# Patient Record
Sex: FEMALE | Race: WHITE | Employment: OTHER | URBAN - METROPOLITAN AREA
[De-identification: names, ages, dates, MRNs, and addresses within clinical notes are randomized per-mention and may not be internally consistent; named-entity substitution may affect disease eponyms.]

---

## 2017-05-08 ENCOUNTER — HOSPITAL ENCOUNTER (OUTPATIENT)
Dept: PHYSICAL THERAPY | Age: 62
Discharge: HOME | End: 2017-05-31
Attending: ORTHOPAEDIC SURGERY | Admitting: ORTHOPAEDIC SURGERY

## 2017-05-08 ASSESSMENT — PAIN DESCRIPTION - PAIN TYPE: TYPE: SURGICAL PAIN

## 2017-05-08 ASSESSMENT — PAIN DESCRIPTION - FREQUENCY: FREQUENCY: CONTINUOUS

## 2017-05-08 ASSESSMENT — PAIN DESCRIPTION - ONSET: ONSET: GRADUAL

## 2017-05-08 ASSESSMENT — PAIN DESCRIPTION - LOCATION: LOCATION: KNEE

## 2017-05-08 ASSESSMENT — PAIN DESCRIPTION - PROGRESSION: CLINICAL_PROGRESSION: GRADUALLY IMPROVING

## 2017-05-08 ASSESSMENT — PAIN DESCRIPTION - ORIENTATION: ORIENTATION: RIGHT

## 2017-05-08 ASSESSMENT — PAIN DESCRIPTION - DESCRIPTORS: DESCRIPTORS: ACHING

## 2017-05-08 ASSESSMENT — PAIN SCALES - GENERAL: PAINLEVEL_OUTOF10: 8

## 2017-09-07 ENCOUNTER — OFFICE VISIT (OUTPATIENT)
Dept: ORTHOPEDIC SURGERY | Age: 62
End: 2017-09-07

## 2017-09-07 VITALS
RESPIRATION RATE: 16 BRPM | HEIGHT: 67 IN | TEMPERATURE: 99.8 F | HEART RATE: 84 BPM | WEIGHT: 185 LBS | SYSTOLIC BLOOD PRESSURE: 178 MMHG | BODY MASS INDEX: 29.03 KG/M2 | DIASTOLIC BLOOD PRESSURE: 100 MMHG

## 2017-09-07 DIAGNOSIS — M17.11 PRIMARY OSTEOARTHRITIS OF RIGHT KNEE: ICD-10-CM

## 2017-09-07 DIAGNOSIS — G89.29 CHRONIC PAIN OF RIGHT KNEE: Primary | ICD-10-CM

## 2017-09-07 DIAGNOSIS — M25.561 CHRONIC PAIN OF RIGHT KNEE: Primary | ICD-10-CM

## 2017-09-07 PROCEDURE — 99202 OFFICE O/P NEW SF 15 MIN: CPT | Performed by: ORTHOPAEDIC SURGERY

## 2017-09-07 PROCEDURE — 20610 DRAIN/INJ JOINT/BURSA W/O US: CPT | Performed by: ORTHOPAEDIC SURGERY

## 2018-03-29 ENCOUNTER — OFFICE VISIT (OUTPATIENT)
Dept: ORTHOPEDIC SURGERY | Age: 63
End: 2018-03-29

## 2018-03-29 VITALS
RESPIRATION RATE: 16 BRPM | HEIGHT: 67 IN | WEIGHT: 185 LBS | SYSTOLIC BLOOD PRESSURE: 149 MMHG | DIASTOLIC BLOOD PRESSURE: 89 MMHG | TEMPERATURE: 99.3 F | HEART RATE: 74 BPM | BODY MASS INDEX: 29.03 KG/M2

## 2018-03-29 DIAGNOSIS — M17.11 PRIMARY OSTEOARTHRITIS OF RIGHT KNEE: Primary | ICD-10-CM

## 2018-03-29 PROCEDURE — 3014F SCREEN MAMMO DOC REV: CPT | Performed by: ORTHOPAEDIC SURGERY

## 2018-03-29 PROCEDURE — G8427 DOCREV CUR MEDS BY ELIG CLIN: HCPCS | Performed by: ORTHOPAEDIC SURGERY

## 2018-03-29 PROCEDURE — 3017F COLORECTAL CA SCREEN DOC REV: CPT | Performed by: ORTHOPAEDIC SURGERY

## 2018-03-29 PROCEDURE — G8419 CALC BMI OUT NRM PARAM NOF/U: HCPCS | Performed by: ORTHOPAEDIC SURGERY

## 2018-03-29 PROCEDURE — G8484 FLU IMMUNIZE NO ADMIN: HCPCS | Performed by: ORTHOPAEDIC SURGERY

## 2018-03-29 PROCEDURE — 20610 DRAIN/INJ JOINT/BURSA W/O US: CPT | Performed by: ORTHOPAEDIC SURGERY

## 2018-03-29 PROCEDURE — 99214 OFFICE O/P EST MOD 30 MIN: CPT | Performed by: ORTHOPAEDIC SURGERY

## 2018-03-29 PROCEDURE — 1036F TOBACCO NON-USER: CPT | Performed by: ORTHOPAEDIC SURGERY

## 2018-03-29 NOTE — LETTER
participants health issues and personal information. As a matter of trust, it is your duty to keep everything you hear confidential.  Nothing that identifies a participant in any way (including job, ethnicity, Nondenominational, etc.) can be shared outside of this group setting. I have read and agree to the following statements:        · I agree to meet with a group of patients and my doctor. I understand that I have the choice to be seen by my physician in this group or individually and that I may leave the group visit anytime I feel uncomfortable. · I understand that discussions will occur regarding individual identifiable health information during the shared medical appointment and I will maintain the confidentiality of [facility] health information or other information heard during the appointment. · I am committed to maintaining this confidentiality even if I am no longer participating in shared medical appointments. · I understand that the information I share with the physician and staff will be heard by the other participants and there is a possibility that the information disclosed in the shared medical appointment may be re-disclosed by other participants. I have been notified of this potential disclosure and I voluntarily agree to participate in the shared medical appointment. · I know that I dont have to share any personal information with the group or health care providers unless, I choose to do so. · Like any doctors appointment, I agree to be responsible for the bill and / or co-payment associated with this physician appointment. Shared Medical Appointment              THIS SUPPLEMENTAL NOTICE SUPPLEMENTS AND DOES NOT REPLACE THE [facility] CONSENT, PATIENT RESPONSIBILITY AND NOTICE OF PRIVACY PRACTICE.         Marilin Murillo    1955        Patients Signature: ____________________________________________________         DATE: ____/____/___ Spouses / Support Persons Signature (if applicable) _________________________     DATE: __/__/__    **Each person will be asked to sign this commitment before each Shared Medical Appointment. Thank You ! SURGERY SCHEDULING TIMES                                                                                                                                                      Tonia Major                                                                                       1955                                                                           SURGERY DATE: ___/___/___                                                                      SURGERY TIME:  ___:___ AM/PM                                                                      ARRIVAL TIME:   ___:___  AM/PM                                                                                                                        **PLEASE REPORT TO THE                                                       INFORMATION DESK IN THE MAIN LOBBY                                                          OF THE HOSPITAL.         [facility]

## 2018-09-25 ENCOUNTER — TELEPHONE (OUTPATIENT)
Dept: ORTHOPEDIC SURGERY | Age: 63
End: 2018-09-25

## 2018-10-04 ENCOUNTER — OFFICE VISIT (OUTPATIENT)
Dept: ORTHOPEDIC SURGERY | Age: 63
End: 2018-10-04
Payer: COMMERCIAL

## 2018-10-04 VITALS
BODY MASS INDEX: 33.27 KG/M2 | SYSTOLIC BLOOD PRESSURE: 145 MMHG | DIASTOLIC BLOOD PRESSURE: 92 MMHG | HEART RATE: 98 BPM | WEIGHT: 212 LBS | TEMPERATURE: 97.2 F | HEIGHT: 67 IN

## 2018-10-04 DIAGNOSIS — M17.11 ARTHRITIS OF RIGHT KNEE: ICD-10-CM

## 2018-10-04 DIAGNOSIS — M25.561 RIGHT KNEE PAIN, UNSPECIFIED CHRONICITY: Primary | ICD-10-CM

## 2018-10-04 PROCEDURE — 3017F COLORECTAL CA SCREEN DOC REV: CPT | Performed by: PHYSICIAN ASSISTANT

## 2018-10-04 PROCEDURE — G8417 CALC BMI ABV UP PARAM F/U: HCPCS | Performed by: PHYSICIAN ASSISTANT

## 2018-10-04 PROCEDURE — G8427 DOCREV CUR MEDS BY ELIG CLIN: HCPCS | Performed by: PHYSICIAN ASSISTANT

## 2018-10-04 PROCEDURE — G8484 FLU IMMUNIZE NO ADMIN: HCPCS | Performed by: PHYSICIAN ASSISTANT

## 2018-10-04 PROCEDURE — 99212 OFFICE O/P EST SF 10 MIN: CPT | Performed by: PHYSICIAN ASSISTANT

## 2018-10-04 PROCEDURE — 20610 DRAIN/INJ JOINT/BURSA W/O US: CPT | Performed by: PHYSICIAN ASSISTANT

## 2018-10-04 PROCEDURE — 1036F TOBACCO NON-USER: CPT | Performed by: PHYSICIAN ASSISTANT

## 2018-10-09 ENCOUNTER — TELEPHONE (OUTPATIENT)
Dept: ORTHOPEDIC SURGERY | Age: 63
End: 2018-10-09

## 2018-10-09 PROBLEM — M17.11 ARTHRITIS OF RIGHT KNEE: Status: ACTIVE | Noted: 2018-10-09

## 2018-10-09 NOTE — PROGRESS NOTES
with intra-articular injection today. Cortisone Injection                                                       PROCEDURE NOTE:     Pre op Diagnosis:  right knee pain     Post op Diagnosis: Same  With the patient's permission, her right knee was prepped  in standard sterile fashion with  Alcohol and 2 cc of 0.25% Marcaine and 1 cc of Kenalog 40 mg was injected into the right lateral compartment  without difficulty. The patient tolerated this well without difficulty. A band-aid was applied. The patient was advised to ice the knee for 15-20 minutes to relieve any injection site related pain. She would like to try Visco supplementation injections at this time. Follow Up: After Visco supplementation injections have been authorized. Call or return to clinic prn if these symptoms worsen or fail to improve as anticipated.

## 2018-10-23 ENCOUNTER — OFFICE VISIT (OUTPATIENT)
Dept: ORTHOPEDIC SURGERY | Age: 63
End: 2018-10-23
Payer: COMMERCIAL

## 2018-10-23 ENCOUNTER — TELEPHONE (OUTPATIENT)
Dept: ORTHOPEDIC SURGERY | Age: 63
End: 2018-10-23

## 2018-10-23 VITALS
SYSTOLIC BLOOD PRESSURE: 148 MMHG | HEIGHT: 67 IN | DIASTOLIC BLOOD PRESSURE: 80 MMHG | HEART RATE: 68 BPM | WEIGHT: 212 LBS | BODY MASS INDEX: 33.27 KG/M2 | TEMPERATURE: 97.9 F

## 2018-10-23 DIAGNOSIS — M17.11 ARTHRITIS OF RIGHT KNEE: Primary | ICD-10-CM

## 2018-10-23 PROCEDURE — 20610 DRAIN/INJ JOINT/BURSA W/O US: CPT | Performed by: PHYSICIAN ASSISTANT

## 2018-10-23 NOTE — TELEPHONE ENCOUNTER
Spoke with patient. Please take from your supply for the right knee with Euflexxa.     Auth#: [employer]  Date:

## 2018-11-01 ENCOUNTER — OFFICE VISIT (OUTPATIENT)
Dept: ORTHOPEDIC SURGERY | Age: 63
End: 2018-11-01
Payer: COMMERCIAL

## 2018-11-01 VITALS — HEIGHT: 67 IN | TEMPERATURE: 98.1 F | WEIGHT: 212 LBS | BODY MASS INDEX: 33.27 KG/M2

## 2018-11-01 DIAGNOSIS — M17.11 ARTHRITIS OF RIGHT KNEE: Primary | ICD-10-CM

## 2018-11-01 PROCEDURE — 20610 DRAIN/INJ JOINT/BURSA W/O US: CPT | Performed by: PHYSICIAN ASSISTANT

## 2018-11-14 ENCOUNTER — OFFICE VISIT (OUTPATIENT)
Dept: ORTHOPEDIC SURGERY | Age: 63
End: 2018-11-14
Payer: COMMERCIAL

## 2018-11-14 VITALS — BODY MASS INDEX: 33.27 KG/M2 | HEIGHT: 67 IN | TEMPERATURE: 98.2 F | WEIGHT: 212 LBS

## 2018-11-14 DIAGNOSIS — M17.11 PRIMARY OSTEOARTHRITIS OF RIGHT KNEE: Primary | ICD-10-CM

## 2018-11-14 PROCEDURE — 20610 DRAIN/INJ JOINT/BURSA W/O US: CPT | Performed by: PHYSICIAN ASSISTANT

## 2018-11-14 NOTE — PROGRESS NOTES
Subjective:    She  is here for repeat Euflexxa injection #3 for the  right knee. Objective:   Temperature 98.2 °F (36.8 °C), temperature source Temporal, height 5' 7\" (1.702 m), weight 212 lb (96.2 kg), currently breastfeeding. There is a mild joint effusion noted of the right knee. There is mild pain with range of motion testing. There is no significant  instability noted. Assessment:    ICD-10-CM    1. Primary osteoarthritis of right knee M17.11 91662 - VT DRAIN/INJECT LARGE JOINT/BURSA     EUFLEXXA INJ PER DOSE       Plan:  Proceed with repeat injection in the series of 3 injections. PROCEDURE NOTE:  PRE-PROCEDURE DIAGNOSIS: DJD knee  POST-PROCEDURE DIAGNOSIS: DJD knee  With the patient's permission, her right knee was prepped in standard sterile fashion with  Alcohol. The prefilled injection of the preferred visco supplementation ( Euflexxa 20 mg/ 2 ML ) was injected into the  lateral joint space compartment without difficulty. The patient tolerated the procedure(s) well without difficulty. A band-aid was applied. POST-PROCEDURE INSTRUCTIONS GIVEN TO PATIENT:   She was advised to ice the knee for 15-20 minutes to relieve any injection site related pain. Decrease activity for the next 24 to 48 hours. May use prescription or OTC pain relievers as needed    FOLLOW-UP: 6 weeks  As directed or call or return to clinic if these symptoms worsen or fail to improve as anticipated. If at any time you are concerned you may contact the office for further instructions or care.

## 2018-11-28 ENCOUNTER — TELEPHONE (OUTPATIENT)
Dept: ORTHOPEDIC SURGERY | Age: 63
End: 2018-11-28

## 2019-01-14 ENCOUNTER — OFFICE VISIT (OUTPATIENT)
Dept: ORTHOPEDIC SURGERY | Age: 64
End: 2019-01-14
Payer: COMMERCIAL

## 2019-01-14 VITALS
HEIGHT: 67 IN | TEMPERATURE: 98.1 F | SYSTOLIC BLOOD PRESSURE: 167 MMHG | BODY MASS INDEX: 32.39 KG/M2 | HEART RATE: 82 BPM | WEIGHT: 206.4 LBS | DIASTOLIC BLOOD PRESSURE: 84 MMHG

## 2019-01-14 DIAGNOSIS — M17.11 ARTHRITIS OF RIGHT KNEE: Primary | ICD-10-CM

## 2019-01-14 PROCEDURE — G8484 FLU IMMUNIZE NO ADMIN: HCPCS | Performed by: ORTHOPAEDIC SURGERY

## 2019-01-14 PROCEDURE — G8417 CALC BMI ABV UP PARAM F/U: HCPCS | Performed by: ORTHOPAEDIC SURGERY

## 2019-01-14 PROCEDURE — 99214 OFFICE O/P EST MOD 30 MIN: CPT | Performed by: ORTHOPAEDIC SURGERY

## 2019-01-14 PROCEDURE — 3017F COLORECTAL CA SCREEN DOC REV: CPT | Performed by: ORTHOPAEDIC SURGERY

## 2019-01-14 PROCEDURE — G8428 CUR MEDS NOT DOCUMENT: HCPCS | Performed by: ORTHOPAEDIC SURGERY

## 2019-01-14 PROCEDURE — 1036F TOBACCO NON-USER: CPT | Performed by: ORTHOPAEDIC SURGERY

## 2019-02-14 ENCOUNTER — PREP FOR PROCEDURE (OUTPATIENT)
Dept: ORTHOPEDICS UNIT | Age: 64
End: 2019-02-14

## 2019-02-18 ENCOUNTER — PREP FOR PROCEDURE (OUTPATIENT)
Dept: ORTHOPEDIC SURGERY | Age: 64
End: 2019-02-18

## 2019-02-18 DIAGNOSIS — M17.11 PRIMARY OSTEOARTHRITIS OF RIGHT KNEE: Primary | ICD-10-CM

## 2019-02-18 RX ORDER — OXYCODONE HYDROCHLORIDE 5 MG/1
10 TABLET ORAL ONCE
Status: CANCELLED | OUTPATIENT
Start: 2019-02-18 | End: 2019-02-18

## 2019-02-18 RX ORDER — CELECOXIB 200 MG/1
200 CAPSULE ORAL ONCE
Status: CANCELLED | OUTPATIENT
Start: 2019-02-18 | End: 2019-02-18

## 2019-02-19 ENCOUNTER — HOSPITAL ENCOUNTER (OUTPATIENT)
Dept: PREADMISSION TESTING | Age: 64
Discharge: HOME | End: 2019-02-23
Payer: COMMERCIAL

## 2019-02-19 DIAGNOSIS — M17.11 PRIMARY OSTEOARTHRITIS OF RIGHT KNEE: ICD-10-CM

## 2019-02-19 LAB
ABO/RH: NORMAL
ALBUMIN SERPL-MCNC: 4.5 G/DL (ref 3.4–5)
ANION GAP SERPL CALCULATED.3IONS-SCNC: 11 MMOL/L (ref 3–16)
ANTIBODY SCREEN: NORMAL
APTT: 29.7 SEC (ref 26–36)
BASOPHILS ABSOLUTE: 0 K/UL (ref 0–0.2)
BASOPHILS RELATIVE PERCENT: 0.3 %
BILIRUBIN URINE: NEGATIVE
BLOOD, URINE: ABNORMAL
BUN BLDV-MCNC: 11 MG/DL (ref 7–20)
CALCIUM SERPL-MCNC: 9.5 MG/DL (ref 8.3–10.6)
CHLORIDE BLD-SCNC: 98 MMOL/L (ref 99–110)
CLARITY: ABNORMAL
CO2: 28 MMOL/L (ref 21–32)
COLOR: YELLOW
CREAT SERPL-MCNC: 0.6 MG/DL (ref 0.6–1.2)
EOSINOPHILS ABSOLUTE: 0.2 K/UL (ref 0–0.6)
EOSINOPHILS RELATIVE PERCENT: 2.3 %
EPITHELIAL CELLS, UA: 3 /HPF (ref 0–5)
ESTIMATED AVERAGE GLUCOSE: 108.3 MG/DL
GFR AFRICAN AMERICAN: >60
GFR NON-AFRICAN AMERICAN: >60
GLUCOSE BLD-MCNC: 88 MG/DL (ref 70–99)
GLUCOSE URINE: NEGATIVE MG/DL
HBA1C MFR BLD: 5.4 %
HCT VFR BLD CALC: 42.9 % (ref 36–48)
HEMOGLOBIN: 14.4 G/DL (ref 12–16)
HYALINE CASTS: 2 /LPF (ref 0–8)
INR BLD: 0.93 (ref 0.86–1.14)
KETONES, URINE: NEGATIVE MG/DL
LEUKOCYTE ESTERASE, URINE: ABNORMAL
LYMPHOCYTES ABSOLUTE: 1.7 K/UL (ref 1–5.1)
LYMPHOCYTES RELATIVE PERCENT: 26.1 %
MCH RBC QN AUTO: 28.6 PG (ref 26–34)
MCHC RBC AUTO-ENTMCNC: 33.6 G/DL (ref 31–36)
MCV RBC AUTO: 85 FL (ref 80–100)
MICROSCOPIC EXAMINATION: YES
MONOCYTES ABSOLUTE: 0.8 K/UL (ref 0–1.3)
MONOCYTES RELATIVE PERCENT: 11.7 %
NEUTROPHILS ABSOLUTE: 3.9 K/UL (ref 1.7–7.7)
NEUTROPHILS RELATIVE PERCENT: 59.6 %
NITRITE, URINE: NEGATIVE
PDW BLD-RTO: 14.6 % (ref 12.4–15.4)
PH UA: 5.5
PLATELET # BLD: 287 K/UL (ref 135–450)
PMV BLD AUTO: 8.9 FL (ref 5–10.5)
POTASSIUM SERPL-SCNC: 4.2 MMOL/L (ref 3.5–5.1)
PREALBUMIN: 22.9 MG/DL (ref 20–40)
PROTEIN UA: NEGATIVE MG/DL
PROTHROMBIN TIME: 10.6 SEC (ref 9.8–13)
RBC # BLD: 5.04 M/UL (ref 4–5.2)
RBC UA: 1 /HPF (ref 0–4)
SEDIMENTATION RATE, ERYTHROCYTE: 9 MM/HR (ref 0–30)
SODIUM BLD-SCNC: 137 MMOL/L (ref 136–145)
SPECIFIC GRAVITY UA: 1.01
URINE REFLEX TO CULTURE: YES
URINE TYPE: ABNORMAL
UROBILINOGEN, URINE: 0.2 E.U./DL
VITAMIN D 25-HYDROXY: 24 NG/ML
WBC # BLD: 6.6 K/UL (ref 4–11)
WBC UA: 23 /HPF (ref 0–5)

## 2019-02-19 PROCEDURE — 80048 BASIC METABOLIC PNL TOTAL CA: CPT

## 2019-02-19 PROCEDURE — 86900 BLOOD TYPING SEROLOGIC ABO: CPT

## 2019-02-19 PROCEDURE — 85610 PROTHROMBIN TIME: CPT

## 2019-02-19 PROCEDURE — 85025 COMPLETE CBC W/AUTO DIFF WBC: CPT

## 2019-02-19 PROCEDURE — 82306 VITAMIN D 25 HYDROXY: CPT

## 2019-02-19 PROCEDURE — 82040 ASSAY OF SERUM ALBUMIN: CPT

## 2019-02-19 PROCEDURE — 87641 MR-STAPH DNA AMP PROBE: CPT

## 2019-02-19 PROCEDURE — 86850 RBC ANTIBODY SCREEN: CPT

## 2019-02-19 PROCEDURE — 85652 RBC SED RATE AUTOMATED: CPT

## 2019-02-19 PROCEDURE — 84134 ASSAY OF PREALBUMIN: CPT

## 2019-02-19 PROCEDURE — 81001 URINALYSIS AUTO W/SCOPE: CPT

## 2019-02-19 PROCEDURE — 87086 URINE CULTURE/COLONY COUNT: CPT

## 2019-02-19 PROCEDURE — 85730 THROMBOPLASTIN TIME PARTIAL: CPT

## 2019-02-19 PROCEDURE — 86901 BLOOD TYPING SEROLOGIC RH(D): CPT

## 2019-02-19 PROCEDURE — 83036 HEMOGLOBIN GLYCOSYLATED A1C: CPT

## 2019-02-20 LAB
MRSA SCREEN RT-PCR: NORMAL
URINE CULTURE, ROUTINE: NORMAL

## 2019-02-21 ENCOUNTER — OFFICE VISIT (OUTPATIENT)
Dept: ORTHOPEDIC SURGERY | Age: 64
End: 2019-02-21

## 2019-02-21 DIAGNOSIS — M17.11 PRIMARY OSTEOARTHRITIS OF RIGHT KNEE: Primary | ICD-10-CM

## 2019-02-21 PROCEDURE — 99999 PR OFFICE/OUTPT VISIT,PROCEDURE ONLY: CPT | Performed by: ORTHOPAEDIC SURGERY

## 2019-02-21 RX ORDER — ATORVASTATIN CALCIUM 40 MG/1
TABLET, FILM COATED ORAL
Status: ON HOLD | COMMUNITY
Start: 2017-02-06 | End: 2019-02-27 | Stop reason: ALTCHOICE

## 2019-02-21 RX ORDER — LORAZEPAM 0.5 MG/1
.5-1 TABLET ORAL DAILY PRN
COMMUNITY
Start: 2019-02-11 | End: 2019-03-13

## 2019-02-21 RX ORDER — GABAPENTIN 300 MG/1
300 CAPSULE ORAL PRN
COMMUNITY
Start: 2019-02-08 | End: 2020-12-29

## 2019-02-21 RX ORDER — NITROGLYCERIN 0.4 MG/1
0.4 TABLET SUBLINGUAL
COMMUNITY
Start: 2018-07-18 | End: 2020-04-02 | Stop reason: ALTCHOICE

## 2019-02-21 RX ORDER — ZOLPIDEM TARTRATE 10 MG/1
10 TABLET ORAL NIGHTLY PRN
COMMUNITY
Start: 2019-02-19 | End: 2019-05-20

## 2019-02-21 RX ORDER — MULTIVIT WITH MINERALS/LUTEIN
1000 TABLET ORAL DAILY
Status: ON HOLD | COMMUNITY
End: 2019-02-27 | Stop reason: HOSPADM

## 2019-02-21 RX ORDER — ASPIRIN 81 MG/1
81 TABLET, CHEWABLE ORAL
Status: ON HOLD | COMMUNITY
Start: 2014-03-17 | End: 2019-02-27 | Stop reason: HOSPADM

## 2019-02-21 RX ORDER — METOPROLOL TARTRATE 50 MG/1
TABLET, FILM COATED ORAL
COMMUNITY
Start: 2017-02-06

## 2019-02-26 ENCOUNTER — ANESTHESIA EVENT (OUTPATIENT)
Dept: OPERATING ROOM | Age: 64
End: 2019-02-26
Payer: COMMERCIAL

## 2019-02-27 ENCOUNTER — APPOINTMENT (OUTPATIENT)
Dept: GENERAL RADIOLOGY | Age: 64
End: 2019-02-27
Attending: ORTHOPAEDIC SURGERY
Payer: COMMERCIAL

## 2019-02-27 ENCOUNTER — ANESTHESIA (OUTPATIENT)
Dept: OPERATING ROOM | Age: 64
End: 2019-02-27
Payer: COMMERCIAL

## 2019-02-27 ENCOUNTER — HOSPITAL ENCOUNTER (OUTPATIENT)
Age: 64
Discharge: HOME | End: 2019-02-28
Attending: ORTHOPAEDIC SURGERY | Admitting: ORTHOPAEDIC SURGERY
Payer: COMMERCIAL

## 2019-02-27 VITALS
OXYGEN SATURATION: 100 % | SYSTOLIC BLOOD PRESSURE: 130 MMHG | DIASTOLIC BLOOD PRESSURE: 73 MMHG | TEMPERATURE: 95.9 F | RESPIRATION RATE: 3 BRPM

## 2019-02-27 DIAGNOSIS — M17.11 ARTHRITIS OF RIGHT KNEE: ICD-10-CM

## 2019-02-27 LAB
ABO/RH: NORMAL
ANTIBODY SCREEN: NORMAL
GLUCOSE BLD-MCNC: 109 MG/DL (ref 70–99)
GLUCOSE BLD-MCNC: 153 MG/DL (ref 70–99)
PERFORMED ON: ABNORMAL
PERFORMED ON: ABNORMAL

## 2019-02-27 PROCEDURE — 2500000003 HC RX 250 WO HCPCS: Performed by: ANESTHESIOLOGY

## 2019-02-27 PROCEDURE — 6360000002 HC RX W HCPCS: Performed by: ORTHOPAEDIC SURGERY

## 2019-02-27 PROCEDURE — 7100000001 HC PACU RECOVERY - ADDTL 15 MIN: Performed by: ORTHOPAEDIC SURGERY

## 2019-02-27 PROCEDURE — 86850 RBC ANTIBODY SCREEN: CPT

## 2019-02-27 PROCEDURE — C1776 JOINT DEVICE (IMPLANTABLE): HCPCS | Performed by: ORTHOPAEDIC SURGERY

## 2019-02-27 PROCEDURE — 6360000002 HC RX W HCPCS: Performed by: ANESTHESIOLOGY

## 2019-02-27 PROCEDURE — 6360000002 HC RX W HCPCS

## 2019-02-27 PROCEDURE — 2580000003 HC RX 258

## 2019-02-27 PROCEDURE — 3700000001 HC ADD 15 MINUTES (ANESTHESIA): Performed by: ORTHOPAEDIC SURGERY

## 2019-02-27 PROCEDURE — 97166 OT EVAL MOD COMPLEX 45 MIN: CPT

## 2019-02-27 PROCEDURE — 88305 TISSUE EXAM BY PATHOLOGIST: CPT

## 2019-02-27 PROCEDURE — 6370000000 HC RX 637 (ALT 250 FOR IP): Performed by: ORTHOPAEDIC SURGERY

## 2019-02-27 PROCEDURE — 2720000010 HC SURG SUPPLY STERILE: Performed by: ORTHOPAEDIC SURGERY

## 2019-02-27 PROCEDURE — 2580000003 HC RX 258: Performed by: NURSE ANESTHETIST, CERTIFIED REGISTERED

## 2019-02-27 PROCEDURE — 2500000003 HC RX 250 WO HCPCS: Performed by: ORTHOPAEDIC SURGERY

## 2019-02-27 PROCEDURE — 97162 PT EVAL MOD COMPLEX 30 MIN: CPT

## 2019-02-27 PROCEDURE — 2580000003 HC RX 258: Performed by: ANESTHESIOLOGY

## 2019-02-27 PROCEDURE — 6360000002 HC RX W HCPCS: Performed by: NURSE ANESTHETIST, CERTIFIED REGISTERED

## 2019-02-27 PROCEDURE — 2700000000 HC OXYGEN THERAPY PER DAY

## 2019-02-27 PROCEDURE — 3700000000 HC ANESTHESIA ATTENDED CARE: Performed by: ORTHOPAEDIC SURGERY

## 2019-02-27 PROCEDURE — 94664 DEMO&/EVAL PT USE INHALER: CPT

## 2019-02-27 PROCEDURE — 3600000005 HC SURGERY LEVEL 5 BASE: Performed by: ORTHOPAEDIC SURGERY

## 2019-02-27 PROCEDURE — 73560 X-RAY EXAM OF KNEE 1 OR 2: CPT

## 2019-02-27 PROCEDURE — 86900 BLOOD TYPING SEROLOGIC ABO: CPT

## 2019-02-27 PROCEDURE — 7100000000 HC PACU RECOVERY - FIRST 15 MIN: Performed by: ORTHOPAEDIC SURGERY

## 2019-02-27 PROCEDURE — 36415 COLL VENOUS BLD VENIPUNCTURE: CPT

## 2019-02-27 PROCEDURE — 97530 THERAPEUTIC ACTIVITIES: CPT

## 2019-02-27 PROCEDURE — 97116 GAIT TRAINING THERAPY: CPT

## 2019-02-27 PROCEDURE — 2709999900 HC NON-CHARGEABLE SUPPLY: Performed by: ORTHOPAEDIC SURGERY

## 2019-02-27 PROCEDURE — 2580000003 HC RX 258: Performed by: ORTHOPAEDIC SURGERY

## 2019-02-27 PROCEDURE — 83036 HEMOGLOBIN GLYCOSYLATED A1C: CPT

## 2019-02-27 PROCEDURE — C1713 ANCHOR/SCREW BN/BN,TIS/BN: HCPCS | Performed by: ORTHOPAEDIC SURGERY

## 2019-02-27 PROCEDURE — 2500000003 HC RX 250 WO HCPCS: Performed by: NURSE ANESTHETIST, CERTIFIED REGISTERED

## 2019-02-27 PROCEDURE — 86901 BLOOD TYPING SEROLOGIC RH(D): CPT

## 2019-02-27 PROCEDURE — 3600000015 HC SURGERY LEVEL 5 ADDTL 15MIN: Performed by: ORTHOPAEDIC SURGERY

## 2019-02-27 PROCEDURE — 88311 DECALCIFY TISSUE: CPT

## 2019-02-27 PROCEDURE — 94760 N-INVAS EAR/PLS OXIMETRY 1: CPT

## 2019-02-27 DEVICE — CEMENT BNE 40GM HI VISC RADPQ FOR REV SURG: Type: IMPLANTABLE DEVICE | Site: KNEE | Status: FUNCTIONAL

## 2019-02-27 DEVICE — COMPONENT FEM SZ 6 NAR R KNEE CO CHROM CEM POST STBL COR: Type: IMPLANTABLE DEVICE | Site: KNEE | Status: FUNCTIONAL

## 2019-02-27 DEVICE — COMPONENT ARTC SURF PS 6-9 CD 11 MM RT TIB KNEE POLYETH: Type: IMPLANTABLE DEVICE | Site: KNEE | Status: FUNCTIONAL

## 2019-02-27 DEVICE — COMPONENT PAT DIA32MM THK8.5MM KNEE POLY CEM CONVENTIONAL: Type: IMPLANTABLE DEVICE | Site: KNEE | Status: FUNCTIONAL

## 2019-02-27 DEVICE — PSN TIB STM 5 DEG SZ D R: Type: IMPLANTABLE DEVICE | Site: KNEE | Status: FUNCTIONAL

## 2019-02-27 RX ORDER — LISINOPRIL AND HYDROCHLOROTHIAZIDE 20; 12.5 MG/1; MG/1
1 TABLET ORAL DAILY
Status: DISCONTINUED | OUTPATIENT
Start: 2019-02-27 | End: 2019-02-27

## 2019-02-27 RX ORDER — TRANEXAMIC ACID 100 MG/ML
INJECTION, SOLUTION INTRAVENOUS
Status: COMPLETED | OUTPATIENT
Start: 2019-02-27 | End: 2019-02-27

## 2019-02-27 RX ORDER — CELECOXIB 200 MG/1
200 CAPSULE ORAL EVERY 24 HOURS
Status: DISCONTINUED | OUTPATIENT
Start: 2019-02-28 | End: 2019-02-28 | Stop reason: HOSPADM

## 2019-02-27 RX ORDER — LABETALOL HYDROCHLORIDE 5 MG/ML
INJECTION, SOLUTION INTRAVENOUS PRN
Status: DISCONTINUED | OUTPATIENT
Start: 2019-02-27 | End: 2019-02-27 | Stop reason: SDUPTHER

## 2019-02-27 RX ORDER — OXYCODONE HYDROCHLORIDE 5 MG/1
5 TABLET ORAL PRN
Status: DISCONTINUED | OUTPATIENT
Start: 2019-02-27 | End: 2019-02-27 | Stop reason: HOSPADM

## 2019-02-27 RX ORDER — SODIUM CHLORIDE 450 MG/100ML
INJECTION, SOLUTION INTRAVENOUS CONTINUOUS
Status: DISCONTINUED | OUTPATIENT
Start: 2019-02-27 | End: 2019-02-28

## 2019-02-27 RX ORDER — FENTANYL CITRATE 50 UG/ML
25 INJECTION, SOLUTION INTRAMUSCULAR; INTRAVENOUS EVERY 5 MIN PRN
Status: DISCONTINUED | OUTPATIENT
Start: 2019-02-27 | End: 2019-02-27 | Stop reason: HOSPADM

## 2019-02-27 RX ORDER — MIDAZOLAM HYDROCHLORIDE 1 MG/ML
INJECTION INTRAMUSCULAR; INTRAVENOUS PRN
Status: DISCONTINUED | OUTPATIENT
Start: 2019-02-27 | End: 2019-02-27 | Stop reason: SDUPTHER

## 2019-02-27 RX ORDER — MORPHINE SULFATE 2 MG/ML
2 INJECTION, SOLUTION INTRAMUSCULAR; INTRAVENOUS EVERY 5 MIN PRN
Status: DISCONTINUED | OUTPATIENT
Start: 2019-02-27 | End: 2019-02-27 | Stop reason: HOSPADM

## 2019-02-27 RX ORDER — LEVOTHYROXINE SODIUM 0.07 MG/1
75 TABLET ORAL DAILY
Status: DISCONTINUED | OUTPATIENT
Start: 2019-02-27 | End: 2019-02-27

## 2019-02-27 RX ORDER — PROPOFOL 10 MG/ML
INJECTION, EMULSION INTRAVENOUS PRN
Status: DISCONTINUED | OUTPATIENT
Start: 2019-02-27 | End: 2019-02-27 | Stop reason: SDUPTHER

## 2019-02-27 RX ORDER — ZOLPIDEM TARTRATE 5 MG/1
10 TABLET ORAL NIGHTLY PRN
Status: DISCONTINUED | OUTPATIENT
Start: 2019-02-27 | End: 2019-02-28 | Stop reason: HOSPADM

## 2019-02-27 RX ORDER — GABAPENTIN 300 MG/1
300 CAPSULE ORAL NIGHTLY
Status: DISCONTINUED | OUTPATIENT
Start: 2019-02-27 | End: 2019-02-28 | Stop reason: HOSPADM

## 2019-02-27 RX ORDER — ASPIRIN 81 MG/1
81 TABLET ORAL 2 TIMES DAILY
Qty: 60 TABLET | Refills: 0 | Status: SHIPPED | OUTPATIENT
Start: 2019-02-27 | End: 2020-12-29

## 2019-02-27 RX ORDER — LOSARTAN POTASSIUM 50 MG/1
100 TABLET ORAL DAILY
Status: DISCONTINUED | OUTPATIENT
Start: 2019-02-27 | End: 2019-02-28 | Stop reason: HOSPADM

## 2019-02-27 RX ORDER — DEXAMETHASONE SODIUM PHOSPHATE 4 MG/ML
INJECTION, SOLUTION INTRA-ARTICULAR; INTRALESIONAL; INTRAMUSCULAR; INTRAVENOUS; SOFT TISSUE PRN
Status: DISCONTINUED | OUTPATIENT
Start: 2019-02-27 | End: 2019-02-27 | Stop reason: SDUPTHER

## 2019-02-27 RX ORDER — ATORVASTATIN CALCIUM 40 MG/1
40 TABLET, FILM COATED ORAL DAILY
Status: DISCONTINUED | OUTPATIENT
Start: 2019-02-27 | End: 2019-02-28 | Stop reason: HOSPADM

## 2019-02-27 RX ORDER — CELECOXIB 200 MG/1
200 CAPSULE ORAL ONCE
Status: COMPLETED | OUTPATIENT
Start: 2019-02-27 | End: 2019-02-27

## 2019-02-27 RX ORDER — SODIUM CHLORIDE 0.9 % (FLUSH) 0.9 %
10 SYRINGE (ML) INJECTION PRN
Status: DISCONTINUED | OUTPATIENT
Start: 2019-02-27 | End: 2019-02-27 | Stop reason: HOSPADM

## 2019-02-27 RX ORDER — MEPERIDINE HYDROCHLORIDE 25 MG/ML
12.5 INJECTION INTRAMUSCULAR; INTRAVENOUS; SUBCUTANEOUS EVERY 5 MIN PRN
Status: DISCONTINUED | OUTPATIENT
Start: 2019-02-27 | End: 2019-02-27 | Stop reason: HOSPADM

## 2019-02-27 RX ORDER — GABAPENTIN 300 MG/1
300 CAPSULE ORAL 2 TIMES DAILY
Status: DISCONTINUED | OUTPATIENT
Start: 2019-02-27 | End: 2019-02-27

## 2019-02-27 RX ORDER — HYDRALAZINE HYDROCHLORIDE 20 MG/ML
INJECTION INTRAMUSCULAR; INTRAVENOUS PRN
Status: DISCONTINUED | OUTPATIENT
Start: 2019-02-27 | End: 2019-02-27 | Stop reason: SDUPTHER

## 2019-02-27 RX ORDER — LEVOTHYROXINE SODIUM 0.07 MG/1
112.5 TABLET ORAL DAILY
Status: DISCONTINUED | OUTPATIENT
Start: 2019-02-28 | End: 2019-02-28 | Stop reason: HOSPADM

## 2019-02-27 RX ORDER — LIDOCAINE HYDROCHLORIDE 20 MG/ML
INJECTION, SOLUTION INFILTRATION; PERINEURAL PRN
Status: DISCONTINUED | OUTPATIENT
Start: 2019-02-27 | End: 2019-02-27 | Stop reason: SDUPTHER

## 2019-02-27 RX ORDER — FENTANYL CITRATE 50 UG/ML
50 INJECTION, SOLUTION INTRAMUSCULAR; INTRAVENOUS EVERY 5 MIN PRN
Status: COMPLETED | OUTPATIENT
Start: 2019-02-27 | End: 2019-02-27

## 2019-02-27 RX ORDER — LORAZEPAM 0.5 MG/1
0.5 TABLET ORAL 2 TIMES DAILY PRN
Status: DISCONTINUED | OUTPATIENT
Start: 2019-02-27 | End: 2019-02-28 | Stop reason: HOSPADM

## 2019-02-27 RX ORDER — OXYCODONE HYDROCHLORIDE 5 MG/1
10 TABLET ORAL ONCE
Status: COMPLETED | OUTPATIENT
Start: 2019-02-27 | End: 2019-02-27

## 2019-02-27 RX ORDER — HYDROCHLOROTHIAZIDE 25 MG/1
12.5 TABLET ORAL DAILY
Status: DISCONTINUED | OUTPATIENT
Start: 2019-02-27 | End: 2019-02-28 | Stop reason: HOSPADM

## 2019-02-27 RX ORDER — SODIUM CHLORIDE 9 MG/ML
INJECTION, SOLUTION INTRAVENOUS CONTINUOUS
Status: DISCONTINUED | OUTPATIENT
Start: 2019-02-27 | End: 2019-02-27

## 2019-02-27 RX ORDER — HYDROMORPHONE HCL 110MG/55ML
PATIENT CONTROLLED ANALGESIA SYRINGE INTRAVENOUS PRN
Status: DISCONTINUED | OUTPATIENT
Start: 2019-02-27 | End: 2019-02-27 | Stop reason: SDUPTHER

## 2019-02-27 RX ORDER — ONDANSETRON 2 MG/ML
INJECTION INTRAMUSCULAR; INTRAVENOUS PRN
Status: DISCONTINUED | OUTPATIENT
Start: 2019-02-27 | End: 2019-02-27 | Stop reason: SDUPTHER

## 2019-02-27 RX ORDER — SUCCINYLCHOLINE CHLORIDE 20 MG/ML
INJECTION INTRAMUSCULAR; INTRAVENOUS PRN
Status: DISCONTINUED | OUTPATIENT
Start: 2019-02-27 | End: 2019-02-27 | Stop reason: SDUPTHER

## 2019-02-27 RX ORDER — LOSARTAN POTASSIUM AND HYDROCHLOROTHIAZIDE 12.5; 1 MG/1; MG/1
1 TABLET ORAL DAILY
COMMUNITY

## 2019-02-27 RX ORDER — PROMETHAZINE HYDROCHLORIDE 25 MG/ML
25 INJECTION, SOLUTION INTRAMUSCULAR; INTRAVENOUS EVERY 6 HOURS PRN
Status: DISCONTINUED | OUTPATIENT
Start: 2019-02-27 | End: 2019-02-27

## 2019-02-27 RX ORDER — DEXTROSE MONOHYDRATE 50 MG/ML
100 INJECTION, SOLUTION INTRAVENOUS PRN
Status: DISCONTINUED | OUTPATIENT
Start: 2019-02-27 | End: 2019-02-28 | Stop reason: HOSPADM

## 2019-02-27 RX ORDER — SODIUM CHLORIDE 0.9 % (FLUSH) 0.9 %
10 SYRINGE (ML) INJECTION PRN
Status: DISCONTINUED | OUTPATIENT
Start: 2019-02-27 | End: 2019-02-28 | Stop reason: HOSPADM

## 2019-02-27 RX ORDER — LORAZEPAM 0.5 MG/1
0.5 TABLET ORAL EVERY 4 HOURS PRN
Status: DISCONTINUED | OUTPATIENT
Start: 2019-02-27 | End: 2019-02-27

## 2019-02-27 RX ORDER — INSULIN GLARGINE 100 [IU]/ML
0.25 INJECTION, SOLUTION SUBCUTANEOUS NIGHTLY
Status: DISCONTINUED | OUTPATIENT
Start: 2019-02-27 | End: 2019-02-27

## 2019-02-27 RX ORDER — MORPHINE SULFATE 2 MG/ML
1 INJECTION, SOLUTION INTRAMUSCULAR; INTRAVENOUS EVERY 5 MIN PRN
Status: DISCONTINUED | OUTPATIENT
Start: 2019-02-27 | End: 2019-02-27 | Stop reason: HOSPADM

## 2019-02-27 RX ORDER — DEXTROSE MONOHYDRATE 25 G/50ML
12.5 INJECTION, SOLUTION INTRAVENOUS PRN
Status: DISCONTINUED | OUTPATIENT
Start: 2019-02-27 | End: 2019-02-28 | Stop reason: HOSPADM

## 2019-02-27 RX ORDER — DOCUSATE SODIUM 100 MG/1
100 CAPSULE, LIQUID FILLED ORAL 2 TIMES DAILY
Status: DISCONTINUED | OUTPATIENT
Start: 2019-02-27 | End: 2019-02-28 | Stop reason: HOSPADM

## 2019-02-27 RX ORDER — MORPHINE SULFATE 4 MG/ML
4 INJECTION, SOLUTION INTRAMUSCULAR; INTRAVENOUS
Status: DISCONTINUED | OUTPATIENT
Start: 2019-02-27 | End: 2019-02-28 | Stop reason: HOSPADM

## 2019-02-27 RX ORDER — NITROGLYCERIN 0.4 MG/1
0.4 TABLET SUBLINGUAL EVERY 5 MIN PRN
Status: DISCONTINUED | OUTPATIENT
Start: 2019-02-27 | End: 2019-02-28 | Stop reason: HOSPADM

## 2019-02-27 RX ORDER — OXYCODONE HYDROCHLORIDE AND ACETAMINOPHEN 5; 325 MG/1; MG/1
2 TABLET ORAL EVERY 4 HOURS PRN
Status: DISCONTINUED | OUTPATIENT
Start: 2019-02-27 | End: 2019-02-28 | Stop reason: HOSPADM

## 2019-02-27 RX ORDER — OXYCODONE HYDROCHLORIDE 5 MG/1
10 TABLET ORAL PRN
Status: DISCONTINUED | OUTPATIENT
Start: 2019-02-27 | End: 2019-02-27 | Stop reason: HOSPADM

## 2019-02-27 RX ORDER — OXYCODONE HYDROCHLORIDE AND ACETAMINOPHEN 5; 325 MG/1; MG/1
1-2 TABLET ORAL EVERY 4 HOURS PRN
Qty: 60 TABLET | Refills: 0 | Status: SHIPPED | OUTPATIENT
Start: 2019-02-27 | End: 2019-03-11 | Stop reason: SDUPTHER

## 2019-02-27 RX ORDER — ONDANSETRON 2 MG/ML
4 INJECTION INTRAMUSCULAR; INTRAVENOUS EVERY 6 HOURS PRN
Status: DISCONTINUED | OUTPATIENT
Start: 2019-02-27 | End: 2019-02-28 | Stop reason: HOSPADM

## 2019-02-27 RX ORDER — ONDANSETRON 2 MG/ML
4 INJECTION INTRAMUSCULAR; INTRAVENOUS
Status: DISCONTINUED | OUTPATIENT
Start: 2019-02-27 | End: 2019-02-27 | Stop reason: HOSPADM

## 2019-02-27 RX ORDER — METOPROLOL TARTRATE 50 MG/1
50 TABLET, FILM COATED ORAL 2 TIMES DAILY
Status: DISCONTINUED | OUTPATIENT
Start: 2019-02-27 | End: 2019-02-28 | Stop reason: HOSPADM

## 2019-02-27 RX ORDER — OXYCODONE HYDROCHLORIDE AND ACETAMINOPHEN 5; 325 MG/1; MG/1
1 TABLET ORAL EVERY 4 HOURS PRN
Status: DISCONTINUED | OUTPATIENT
Start: 2019-02-27 | End: 2019-02-28 | Stop reason: HOSPADM

## 2019-02-27 RX ORDER — ROCURONIUM BROMIDE 10 MG/ML
INJECTION, SOLUTION INTRAVENOUS PRN
Status: DISCONTINUED | OUTPATIENT
Start: 2019-02-27 | End: 2019-02-27 | Stop reason: SDUPTHER

## 2019-02-27 RX ORDER — SODIUM CHLORIDE 0.9 % (FLUSH) 0.9 %
10 SYRINGE (ML) INJECTION EVERY 12 HOURS SCHEDULED
Status: DISCONTINUED | OUTPATIENT
Start: 2019-02-27 | End: 2019-02-27 | Stop reason: HOSPADM

## 2019-02-27 RX ORDER — FENTANYL CITRATE 50 UG/ML
INJECTION, SOLUTION INTRAMUSCULAR; INTRAVENOUS PRN
Status: DISCONTINUED | OUTPATIENT
Start: 2019-02-27 | End: 2019-02-27 | Stop reason: SDUPTHER

## 2019-02-27 RX ORDER — SODIUM CHLORIDE 0.9 % (FLUSH) 0.9 %
10 SYRINGE (ML) INJECTION EVERY 12 HOURS SCHEDULED
Status: DISCONTINUED | OUTPATIENT
Start: 2019-02-27 | End: 2019-02-28 | Stop reason: HOSPADM

## 2019-02-27 RX ORDER — NICOTINE POLACRILEX 4 MG
15 LOZENGE BUCCAL PRN
Status: DISCONTINUED | OUTPATIENT
Start: 2019-02-27 | End: 2019-02-28 | Stop reason: HOSPADM

## 2019-02-27 RX ORDER — MORPHINE SULFATE 2 MG/ML
2 INJECTION, SOLUTION INTRAMUSCULAR; INTRAVENOUS
Status: DISCONTINUED | OUTPATIENT
Start: 2019-02-27 | End: 2019-02-28 | Stop reason: HOSPADM

## 2019-02-27 RX ADMIN — SODIUM CHLORIDE: 9 INJECTION, SOLUTION INTRAVENOUS at 14:15

## 2019-02-27 RX ADMIN — METOPROLOL TARTRATE 50 MG: 50 TABLET ORAL at 20:04

## 2019-02-27 RX ADMIN — SODIUM CHLORIDE: 4.5 INJECTION, SOLUTION INTRAVENOUS at 17:58

## 2019-02-27 RX ADMIN — FENTANYL CITRATE 50 MCG: 50 INJECTION, SOLUTION INTRAMUSCULAR; INTRAVENOUS at 15:13

## 2019-02-27 RX ADMIN — FAMOTIDINE 20 MG: 10 INJECTION, SOLUTION INTRAVENOUS at 20:04

## 2019-02-27 RX ADMIN — MORPHINE SULFATE 4 MG: 4 INJECTION INTRAVENOUS at 21:05

## 2019-02-27 RX ADMIN — ONDANSETRON 4 MG: 2 INJECTION INTRAMUSCULAR; INTRAVENOUS at 19:04

## 2019-02-27 RX ADMIN — FENTANYL CITRATE 100 MCG: 50 INJECTION INTRAMUSCULAR; INTRAVENOUS at 12:54

## 2019-02-27 RX ADMIN — LIDOCAINE HYDROCHLORIDE 100 MG: 20 INJECTION, SOLUTION INFILTRATION; PERINEURAL at 12:57

## 2019-02-27 RX ADMIN — HYDROCHLOROTHIAZIDE 12.5 MG: 25 TABLET ORAL at 21:50

## 2019-02-27 RX ADMIN — HYDROMORPHONE HYDROCHLORIDE 0.5 MG: 2 INJECTION INTRAMUSCULAR; INTRAVENOUS; SUBCUTANEOUS at 13:17

## 2019-02-27 RX ADMIN — OXYCODONE HYDROCHLORIDE 10 MG: 5 TABLET ORAL at 12:22

## 2019-02-27 RX ADMIN — ONDANSETRON 4 MG: 2 INJECTION INTRAMUSCULAR; INTRAVENOUS at 13:56

## 2019-02-27 RX ADMIN — SUGAMMADEX 200 MG: 100 INJECTION, SOLUTION INTRAVENOUS at 14:09

## 2019-02-27 RX ADMIN — PROPOFOL 170 MG: 10 INJECTION, EMULSION INTRAVENOUS at 12:57

## 2019-02-27 RX ADMIN — PROMETHAZINE HYDROCHLORIDE 25 MG: 25 INJECTION INTRAMUSCULAR; INTRAVENOUS at 20:48

## 2019-02-27 RX ADMIN — PHENYLEPHRINE HYDROCHLORIDE 100 MCG: 10 INJECTION, SOLUTION INTRAMUSCULAR; INTRAVENOUS; SUBCUTANEOUS at 13:10

## 2019-02-27 RX ADMIN — FENTANYL CITRATE 50 MCG: 50 INJECTION, SOLUTION INTRAMUSCULAR; INTRAVENOUS at 14:47

## 2019-02-27 RX ADMIN — CELECOXIB 200 MG: 200 CAPSULE ORAL at 12:23

## 2019-02-27 RX ADMIN — SUCCINYLCHOLINE CHLORIDE 140 MG: 20 INJECTION, SOLUTION INTRAMUSCULAR; INTRAVENOUS at 12:57

## 2019-02-27 RX ADMIN — HYDRALAZINE HYDROCHLORIDE 5 MG: 20 INJECTION INTRAMUSCULAR; INTRAVENOUS at 14:27

## 2019-02-27 RX ADMIN — PHENYLEPHRINE HYDROCHLORIDE 50 MCG: 10 INJECTION, SOLUTION INTRAMUSCULAR; INTRAVENOUS; SUBCUTANEOUS at 13:13

## 2019-02-27 RX ADMIN — FENTANYL CITRATE 50 MCG: 50 INJECTION, SOLUTION INTRAMUSCULAR; INTRAVENOUS at 14:56

## 2019-02-27 RX ADMIN — GABAPENTIN 300 MG: 300 CAPSULE ORAL at 20:04

## 2019-02-27 RX ADMIN — OXYCODONE HYDROCHLORIDE AND ACETAMINOPHEN 2 TABLET: 5; 325 TABLET ORAL at 20:04

## 2019-02-27 RX ADMIN — DEXAMETHASONE SODIUM PHOSPHATE 8 MG: 4 INJECTION, SOLUTION INTRAMUSCULAR; INTRAVENOUS at 13:11

## 2019-02-27 RX ADMIN — OXYCODONE HYDROCHLORIDE AND ACETAMINOPHEN 2 TABLET: 5; 325 TABLET ORAL at 16:04

## 2019-02-27 RX ADMIN — ROCURONIUM BROMIDE 5 MG: 10 INJECTION INTRAVENOUS at 12:57

## 2019-02-27 RX ADMIN — SODIUM CHLORIDE: 9 INJECTION, SOLUTION INTRAVENOUS at 12:22

## 2019-02-27 RX ADMIN — LOSARTAN POTASSIUM 100 MG: 50 TABLET ORAL at 21:50

## 2019-02-27 RX ADMIN — LABETALOL HYDROCHLORIDE 2.5 MG: 5 INJECTION, SOLUTION INTRAVENOUS at 13:20

## 2019-02-27 RX ADMIN — LABETALOL HYDROCHLORIDE 5 MG: 5 INJECTION, SOLUTION INTRAVENOUS at 14:20

## 2019-02-27 RX ADMIN — MORPHINE SULFATE 4 MG: 4 INJECTION INTRAVENOUS at 17:36

## 2019-02-27 RX ADMIN — FAMOTIDINE 20 MG: 10 INJECTION, SOLUTION INTRAVENOUS at 12:23

## 2019-02-27 RX ADMIN — LABETALOL HYDROCHLORIDE 2.5 MG: 5 INJECTION, SOLUTION INTRAVENOUS at 13:32

## 2019-02-27 RX ADMIN — HYDROMORPHONE HYDROCHLORIDE 0.5 MG: 2 INJECTION INTRAMUSCULAR; INTRAVENOUS; SUBCUTANEOUS at 13:13

## 2019-02-27 RX ADMIN — FENTANYL CITRATE 50 MCG: 50 INJECTION INTRAMUSCULAR; INTRAVENOUS at 13:24

## 2019-02-27 RX ADMIN — PHENYLEPHRINE HYDROCHLORIDE 50 MCG: 10 INJECTION, SOLUTION INTRAMUSCULAR; INTRAVENOUS; SUBCUTANEOUS at 13:03

## 2019-02-27 RX ADMIN — Medication 2 G: at 12:50

## 2019-02-27 RX ADMIN — FENTANYL CITRATE 50 MCG: 50 INJECTION, SOLUTION INTRAMUSCULAR; INTRAVENOUS at 14:34

## 2019-02-27 RX ADMIN — ROCURONIUM BROMIDE 35 MG: 10 INJECTION INTRAVENOUS at 13:05

## 2019-02-27 RX ADMIN — DOCUSATE SODIUM 100 MG: 100 CAPSULE, LIQUID FILLED ORAL at 20:04

## 2019-02-27 RX ADMIN — PHENYLEPHRINE HYDROCHLORIDE 50 MCG: 10 INJECTION, SOLUTION INTRAMUSCULAR; INTRAVENOUS; SUBCUTANEOUS at 13:06

## 2019-02-27 RX ADMIN — Medication 2 G: at 20:04

## 2019-02-27 RX ADMIN — MIDAZOLAM 2 MG: 1 INJECTION INTRAMUSCULAR; INTRAVENOUS at 12:50

## 2019-02-27 ASSESSMENT — PAIN DESCRIPTION - DESCRIPTORS
DESCRIPTORS: ACHING
DESCRIPTORS: ACHING;CONSTANT
DESCRIPTORS: ACHING
DESCRIPTORS: ACHING;CRAMPING

## 2019-02-27 ASSESSMENT — PULMONARY FUNCTION TESTS
PIF_VALUE: 18
PIF_VALUE: 19
PIF_VALUE: 17
PIF_VALUE: 18
PIF_VALUE: 19
PIF_VALUE: 18
PIF_VALUE: 18
PIF_VALUE: 17
PIF_VALUE: 18
PIF_VALUE: 0
PIF_VALUE: 5
PIF_VALUE: 17
PIF_VALUE: 0
PIF_VALUE: 17
PIF_VALUE: 18
PIF_VALUE: 4
PIF_VALUE: 0
PIF_VALUE: 19
PIF_VALUE: 1
PIF_VALUE: 18
PIF_VALUE: 16
PIF_VALUE: 17
PIF_VALUE: 18
PIF_VALUE: 19
PIF_VALUE: 16
PIF_VALUE: 18
PIF_VALUE: 17
PIF_VALUE: 19
PIF_VALUE: 17
PIF_VALUE: 0
PIF_VALUE: 16
PIF_VALUE: 17
PIF_VALUE: 18
PIF_VALUE: 0
PIF_VALUE: 10
PIF_VALUE: 18
PIF_VALUE: 1
PIF_VALUE: 18
PIF_VALUE: 17
PIF_VALUE: 18
PIF_VALUE: 18
PIF_VALUE: 16
PIF_VALUE: 18
PIF_VALUE: 18
PIF_VALUE: 16
PIF_VALUE: 18
PIF_VALUE: 17
PIF_VALUE: 17
PIF_VALUE: 18
PIF_VALUE: 18
PIF_VALUE: 19
PIF_VALUE: 18
PIF_VALUE: 20
PIF_VALUE: 18
PIF_VALUE: 24
PIF_VALUE: 18
PIF_VALUE: 16
PIF_VALUE: 18
PIF_VALUE: 17
PIF_VALUE: 19
PIF_VALUE: 16
PIF_VALUE: 18
PIF_VALUE: 19

## 2019-02-27 ASSESSMENT — PAIN SCALES - GENERAL
PAINLEVEL_OUTOF10: 10
PAINLEVEL_OUTOF10: 9
PAINLEVEL_OUTOF10: 8
PAINLEVEL_OUTOF10: 8
PAINLEVEL_OUTOF10: 10
PAINLEVEL_OUTOF10: 6
PAINLEVEL_OUTOF10: 7
PAINLEVEL_OUTOF10: 8
PAINLEVEL_OUTOF10: 7
PAINLEVEL_OUTOF10: 7
PAINLEVEL_OUTOF10: 2
PAINLEVEL_OUTOF10: 7
PAINLEVEL_OUTOF10: 7
PAINLEVEL_OUTOF10: 3
PAINLEVEL_OUTOF10: 6

## 2019-02-27 ASSESSMENT — PAIN DESCRIPTION - PAIN TYPE
TYPE: SURGICAL PAIN

## 2019-02-27 ASSESSMENT — ENCOUNTER SYMPTOMS: SHORTNESS OF BREATH: 0

## 2019-02-27 ASSESSMENT — PAIN DESCRIPTION - ONSET
ONSET: ON-GOING

## 2019-02-27 ASSESSMENT — PAIN DESCRIPTION - PROGRESSION
CLINICAL_PROGRESSION: NOT CHANGED

## 2019-02-27 ASSESSMENT — PAIN DESCRIPTION - FREQUENCY
FREQUENCY: CONTINUOUS

## 2019-02-27 ASSESSMENT — PAIN DESCRIPTION - ORIENTATION
ORIENTATION: RIGHT

## 2019-02-27 ASSESSMENT — PAIN DESCRIPTION - LOCATION
LOCATION: KNEE

## 2019-02-27 ASSESSMENT — PAIN - FUNCTIONAL ASSESSMENT
PAIN_FUNCTIONAL_ASSESSMENT: 0-10
PAIN_FUNCTIONAL_ASSESSMENT: ACTIVITIES ARE NOT PREVENTED

## 2019-02-27 ASSESSMENT — LIFESTYLE VARIABLES: SMOKING_STATUS: 0

## 2019-02-28 VITALS
RESPIRATION RATE: 18 BRPM | OXYGEN SATURATION: 96 % | SYSTOLIC BLOOD PRESSURE: 128 MMHG | WEIGHT: 213.19 LBS | DIASTOLIC BLOOD PRESSURE: 80 MMHG | BODY MASS INDEX: 33.46 KG/M2 | HEIGHT: 67 IN | TEMPERATURE: 98 F | HEART RATE: 74 BPM

## 2019-02-28 LAB
ESTIMATED AVERAGE GLUCOSE: 116.9 MG/DL
GLUCOSE BLD-MCNC: 129 MG/DL (ref 70–99)
HBA1C MFR BLD: 5.7 %
HCT VFR BLD CALC: 39.3 % (ref 36–48)
HEMOGLOBIN: 13 G/DL (ref 12–16)
PERFORMED ON: ABNORMAL

## 2019-02-28 PROCEDURE — 6370000000 HC RX 637 (ALT 250 FOR IP): Performed by: NURSE PRACTITIONER

## 2019-02-28 PROCEDURE — 94760 N-INVAS EAR/PLS OXIMETRY 1: CPT

## 2019-02-28 PROCEDURE — 85014 HEMATOCRIT: CPT

## 2019-02-28 PROCEDURE — 97535 SELF CARE MNGMENT TRAINING: CPT

## 2019-02-28 PROCEDURE — 6370000000 HC RX 637 (ALT 250 FOR IP): Performed by: ORTHOPAEDIC SURGERY

## 2019-02-28 PROCEDURE — 97116 GAIT TRAINING THERAPY: CPT

## 2019-02-28 PROCEDURE — 6360000002 HC RX W HCPCS: Performed by: ORTHOPAEDIC SURGERY

## 2019-02-28 PROCEDURE — 97110 THERAPEUTIC EXERCISES: CPT

## 2019-02-28 PROCEDURE — 97530 THERAPEUTIC ACTIVITIES: CPT

## 2019-02-28 PROCEDURE — 36415 COLL VENOUS BLD VENIPUNCTURE: CPT

## 2019-02-28 PROCEDURE — 85018 HEMOGLOBIN: CPT

## 2019-02-28 PROCEDURE — 2700000000 HC OXYGEN THERAPY PER DAY

## 2019-02-28 RX ORDER — FAMOTIDINE 20 MG/1
20 TABLET, FILM COATED ORAL 2 TIMES DAILY
Status: DISCONTINUED | OUTPATIENT
Start: 2019-02-28 | End: 2019-02-28 | Stop reason: HOSPADM

## 2019-02-28 RX ADMIN — OXYCODONE HYDROCHLORIDE AND ACETAMINOPHEN 2 TABLET: 5; 325 TABLET ORAL at 09:32

## 2019-02-28 RX ADMIN — HYDROCHLOROTHIAZIDE 12.5 MG: 25 TABLET ORAL at 09:29

## 2019-02-28 RX ADMIN — FAMOTIDINE 20 MG: 20 TABLET, FILM COATED ORAL at 09:29

## 2019-02-28 RX ADMIN — Medication 2 G: at 05:02

## 2019-02-28 RX ADMIN — LOSARTAN POTASSIUM 100 MG: 50 TABLET ORAL at 09:29

## 2019-02-28 RX ADMIN — CELECOXIB 200 MG: 200 CAPSULE ORAL at 05:01

## 2019-02-28 RX ADMIN — ATORVASTATIN CALCIUM 40 MG: 40 TABLET, FILM COATED ORAL at 09:29

## 2019-02-28 RX ADMIN — ENOXAPARIN SODIUM 30 MG: 30 INJECTION SUBCUTANEOUS at 09:29

## 2019-02-28 RX ADMIN — OXYCODONE HYDROCHLORIDE AND ACETAMINOPHEN 2 TABLET: 5; 325 TABLET ORAL at 01:11

## 2019-02-28 RX ADMIN — LEVOTHYROXINE SODIUM 112.5 MCG: 75 TABLET ORAL at 05:02

## 2019-02-28 RX ADMIN — METOPROLOL TARTRATE 50 MG: 50 TABLET ORAL at 09:29

## 2019-02-28 RX ADMIN — MORPHINE SULFATE 4 MG: 4 INJECTION INTRAVENOUS at 02:28

## 2019-02-28 RX ADMIN — OXYCODONE HYDROCHLORIDE AND ACETAMINOPHEN 2 TABLET: 5; 325 TABLET ORAL at 05:02

## 2019-02-28 RX ADMIN — DOCUSATE SODIUM 100 MG: 100 CAPSULE, LIQUID FILLED ORAL at 09:29

## 2019-02-28 ASSESSMENT — PAIN DESCRIPTION - PROGRESSION
CLINICAL_PROGRESSION: NOT CHANGED

## 2019-02-28 ASSESSMENT — PAIN DESCRIPTION - ORIENTATION
ORIENTATION: RIGHT

## 2019-02-28 ASSESSMENT — PAIN SCALES - GENERAL
PAINLEVEL_OUTOF10: 7
PAINLEVEL_OUTOF10: 4
PAINLEVEL_OUTOF10: 7
PAINLEVEL_OUTOF10: 6
PAINLEVEL_OUTOF10: 7
PAINLEVEL_OUTOF10: 7
PAINLEVEL_OUTOF10: 5

## 2019-02-28 ASSESSMENT — PAIN DESCRIPTION - ONSET
ONSET: ON-GOING

## 2019-02-28 ASSESSMENT — PAIN DESCRIPTION - PAIN TYPE
TYPE: SURGICAL PAIN
TYPE: ACUTE PAIN;SURGICAL PAIN

## 2019-02-28 ASSESSMENT — PAIN DESCRIPTION - LOCATION
LOCATION: KNEE

## 2019-02-28 ASSESSMENT — PAIN DESCRIPTION - FREQUENCY
FREQUENCY: CONTINUOUS

## 2019-02-28 ASSESSMENT — PAIN DESCRIPTION - DESCRIPTORS
DESCRIPTORS: ACHING;CONSTANT

## 2019-03-05 ENCOUNTER — TELEPHONE (OUTPATIENT)
Dept: ORTHOPEDICS UNIT | Age: 64
End: 2019-03-05

## 2019-03-05 ENCOUNTER — TELEPHONE (OUTPATIENT)
Dept: ORTHOPEDIC SURGERY | Age: 64
End: 2019-03-05

## 2019-03-06 ENCOUNTER — TELEPHONE (OUTPATIENT)
Dept: ORTHOPEDIC SURGERY | Age: 64
End: 2019-03-06

## 2019-03-07 ENCOUNTER — TELEPHONE (OUTPATIENT)
Dept: ORTHOPEDIC SURGERY | Age: 64
End: 2019-03-07

## 2019-03-07 DIAGNOSIS — M17.11 ARTHRITIS OF RIGHT KNEE: ICD-10-CM

## 2019-03-07 DIAGNOSIS — Z96.651 STATUS POST TOTAL RIGHT KNEE REPLACEMENT: Primary | ICD-10-CM

## 2019-03-11 RX ORDER — OXYCODONE HYDROCHLORIDE AND ACETAMINOPHEN 5; 325 MG/1; MG/1
1-2 TABLET ORAL EVERY 4 HOURS PRN
Qty: 60 TABLET | Refills: 0 | Status: SHIPPED | OUTPATIENT
Start: 2019-03-11 | End: 2019-03-28 | Stop reason: SDUPTHER

## 2019-03-14 ENCOUNTER — OFFICE VISIT (OUTPATIENT)
Dept: ORTHOPEDIC SURGERY | Age: 64
End: 2019-03-14

## 2019-03-14 VITALS — BODY MASS INDEX: 33.43 KG/M2 | HEIGHT: 67 IN | TEMPERATURE: 97.9 F | WEIGHT: 213 LBS

## 2019-03-14 DIAGNOSIS — Z96.651 STATUS POST TOTAL RIGHT KNEE REPLACEMENT: Primary | ICD-10-CM

## 2019-03-14 PROCEDURE — 99024 POSTOP FOLLOW-UP VISIT: CPT | Performed by: PHYSICIAN ASSISTANT

## 2019-03-28 ENCOUNTER — OFFICE VISIT (OUTPATIENT)
Dept: ORTHOPEDIC SURGERY | Age: 64
End: 2019-03-28

## 2019-03-28 VITALS
HEART RATE: 93 BPM | HEIGHT: 67 IN | TEMPERATURE: 98.8 F | WEIGHT: 213 LBS | SYSTOLIC BLOOD PRESSURE: 139 MMHG | BODY MASS INDEX: 33.43 KG/M2 | DIASTOLIC BLOOD PRESSURE: 82 MMHG

## 2019-03-28 DIAGNOSIS — Z96.651 STATUS POST TOTAL RIGHT KNEE REPLACEMENT: ICD-10-CM

## 2019-03-28 DIAGNOSIS — Z96.651 STATUS POST TOTAL RIGHT KNEE REPLACEMENT: Primary | ICD-10-CM

## 2019-03-28 DIAGNOSIS — M17.11 ARTHRITIS OF RIGHT KNEE: ICD-10-CM

## 2019-03-28 PROCEDURE — 99024 POSTOP FOLLOW-UP VISIT: CPT | Performed by: ORTHOPAEDIC SURGERY

## 2019-03-28 NOTE — TELEPHONE ENCOUNTER
Pt was just seen today and forgot to get pain med refill   requesting refill of percocet   pls call pt

## 2019-04-01 RX ORDER — OXYCODONE HYDROCHLORIDE AND ACETAMINOPHEN 5; 325 MG/1; MG/1
1 TABLET ORAL EVERY 4 HOURS PRN
Qty: 42 TABLET | Refills: 0 | Status: SHIPPED | OUTPATIENT
Start: 2019-04-01 | End: 2019-04-29 | Stop reason: SDUPTHER

## 2019-04-02 ENCOUNTER — TELEPHONE (OUTPATIENT)
Dept: ORTHOPEDIC SURGERY | Age: 64
End: 2019-04-02

## 2019-04-02 DIAGNOSIS — Z96.651 STATUS POST TOTAL RIGHT KNEE REPLACEMENT: Primary | ICD-10-CM

## 2019-04-02 NOTE — TELEPHONE ENCOUNTER
Patient called to request and order for outpatient PT. She is requesting that her referral be sent to [facility] outpatient therapy.     Please call patient when complete:  [phone]

## 2019-04-11 ENCOUNTER — HOSPITAL ENCOUNTER (OUTPATIENT)
Dept: PHYSICAL THERAPY | Age: 64
Setting detail: THERAPIES SERIES
Discharge: HOME | End: 2019-04-11
Payer: COMMERCIAL

## 2019-04-11 PROCEDURE — 97161 PT EVAL LOW COMPLEX 20 MIN: CPT | Performed by: PHYSICAL THERAPIST

## 2019-04-11 PROCEDURE — 97110 THERAPEUTIC EXERCISES: CPT | Performed by: PHYSICAL THERAPIST

## 2019-04-11 PROCEDURE — 97530 THERAPEUTIC ACTIVITIES: CPT | Performed by: PHYSICAL THERAPIST

## 2019-04-11 PROCEDURE — 97112 NEUROMUSCULAR REEDUCATION: CPT | Performed by: PHYSICAL THERAPIST

## 2019-04-11 ASSESSMENT — PAIN DESCRIPTION - PAIN TYPE: TYPE: SURGICAL PAIN

## 2019-04-11 ASSESSMENT — PAIN DESCRIPTION - FREQUENCY: FREQUENCY: INTERMITTENT

## 2019-04-11 ASSESSMENT — PAIN DESCRIPTION - LOCATION: LOCATION: KNEE

## 2019-04-11 ASSESSMENT — PAIN - FUNCTIONAL ASSESSMENT: PAIN_FUNCTIONAL_ASSESSMENT: PREVENTS OR INTERFERES WITH MANY ACTIVE NOT PASSIVE ACTIVITIES

## 2019-04-11 ASSESSMENT — PAIN SCALES - GENERAL: PAINLEVEL_OUTOF10: 9

## 2019-04-11 ASSESSMENT — PAIN DESCRIPTION - ORIENTATION: ORIENTATION: RIGHT

## 2019-04-11 ASSESSMENT — PAIN DESCRIPTION - PROGRESSION: CLINICAL_PROGRESSION: GRADUALLY IMPROVING

## 2019-04-11 ASSESSMENT — PAIN DESCRIPTION - DESCRIPTORS: DESCRIPTORS: ACHING;BURNING;SHARP

## 2019-04-15 NOTE — PLAN OF CARE
Modalities (For modulating pain/tenderness/paresthesias, reducing swelling/inflammation/tightness, improving soft tissue extensibility, and/or to increase muscle tone/strength):     [] Ultrasound  [] Electrical Stimulation        [] Cervical Traction [] Lumbar Traction    ? [] Cold/hotpack [] Iontophoresis   Other:      []          []      Assessment:  Pt presents to PT with R knee pain, stiffness, and weakness due to R TKR. Pt will benefit from skilled PT to reduce pain, improve ROM/Functional mobility, gait, and strength, so that the patient can return to work without restrictions and return to all PLOF. Goals:  Patient Goal: \"Walk without a limp, go down steps, and relieve pain at night\"  Short Term Goals - Within 4 weeks, patient will:  1. Report 0-1/10 pain at rest  2. Sleep through the night undisturbed by pain. 3. Demonstrate R Knee PROM 0-120 deg. 4. Ascend/Descend flight of stairs step-over-step. Long Term Goals - Within 8 weeks, patient will:  1. Report 0-1/10 pain with ADLs. 2. Demonstrate HEP independently. 3. Demonstrate R Knee AROm 0-120 deg. 4. Return to work without restrictions. Frequency/Duration:  # Days per week: [] 1 day # Weeks: [] 1 week [] 5 weeks     [x] 2 days? [] 2 weeks [] 6 weeks     [] 3 days   [] 3 weeks [] 7 weeks     [] 4 days   [] 4 weeks [x] 8 weeks    Rehab Potential: [] Excellent [x] Good [] Fair  [] Poor       Electronically signed by: ,  [id number]       Michael Meehan PT        If you have any questions or concerns, please don't hesitate to call.   Thank you for your referral.      Physician Signature:________________________________Date:__________________  By signing above, therapists plan is approved by physician

## 2019-04-16 ENCOUNTER — HOSPITAL ENCOUNTER (OUTPATIENT)
Dept: PHYSICAL THERAPY | Age: 64
Setting detail: THERAPIES SERIES
Discharge: HOME | End: 2019-04-16
Payer: COMMERCIAL

## 2019-04-16 ASSESSMENT — PAIN DESCRIPTION - PAIN TYPE: TYPE: SURGICAL PAIN

## 2019-04-16 ASSESSMENT — PAIN - FUNCTIONAL ASSESSMENT: PAIN_FUNCTIONAL_ASSESSMENT: PREVENTS OR INTERFERES WITH MANY ACTIVE NOT PASSIVE ACTIVITIES

## 2019-04-16 ASSESSMENT — PAIN DESCRIPTION - LOCATION: LOCATION: KNEE

## 2019-04-16 ASSESSMENT — PAIN DESCRIPTION - PROGRESSION: CLINICAL_PROGRESSION: GRADUALLY IMPROVING

## 2019-04-16 ASSESSMENT — PAIN DESCRIPTION - DESCRIPTORS: DESCRIPTORS: ACHING;BURNING;SHARP

## 2019-04-16 ASSESSMENT — PAIN DESCRIPTION - ORIENTATION: ORIENTATION: RIGHT

## 2019-04-16 ASSESSMENT — PAIN SCALES - GENERAL: PAINLEVEL_OUTOF10: 9

## 2019-04-16 ASSESSMENT — PAIN DESCRIPTION - FREQUENCY: FREQUENCY: INTERMITTENT

## 2019-04-16 NOTE — PATIENT CARE CONFERENCE
Physical Therapy  Cancellation/No-show Note  Patient Name:  Shama Lu  :  1955   Date:  2019  MRN: [id number]  Cancelled visits to date: 1  No-shows to date: 0    For today's appointment patient:  [x]  Cancelled  []  Rescheduled appointment  []  No-show     Reason given by patient:  []  Patient ill  []  Conflicting appointment  []  No transportation    [x]  Conflict with work  []  No reason given  []  Other:     Comments:      Electronically signed by:  Julissa Singh PT

## 2019-04-16 NOTE — PROGRESS NOTES
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 Palpation: non-tender  Scar: Incision site healing well. ROM:   R Knee PROM Flex: 110 deg  R Knee PROM Ext: lacking approximately 5 deg    Strength:   R Knee Ext: 3+/5    Stairs: Pt has difficulty descending stairs, moving both feet to each step, pain with eccentric control. Gait: Antalgic pattern  Gait Deviations: Decreased step length, decreased R knee flexion      Assessment   Conditions Requiring Skilled Therapeutic Intervention  Body structures, Functions, Activity limitations: Decreased functional mobility ; Decreased ADL status; Decreased ROM; Decreased strength;Decreased balance;Decreased endurance; Increased Pain  Assessment: Pt presents to PT with R knee pain, stiffness, and weakness due to R TKR. Pt will benefit from skilled PT to reduce pain, improve ROM/Functional mobility, gait, and strength, so that the patient can return to work without restrictions and return to all PLOF.     Treatment Diagnosis: R Knee Stiffness, Pain, due to s/p R TKR @2/27/19)  Prognosis: Good  Decision Making: Low Complexity  REQUIRES PT FOLLOW UP: Yes     Plan   Plan  Times per week: 2  Plan weeks: 8  Current Treatment Recommendations: Strengthening, ROM, Balance Training, Functional Mobility Training, Endurance Training, Gait Training, Stair training, Neuromuscular Re-education, Manual Therapy - Soft Tissue Mobilization, Manual Therapy - Joint Manipulation, Pain Management, Home Exercise Program    OutComes Score  LEFS Total Score: 27     04/11/19 1341   The Lower Extremity Functional Scale   Any of your usual work, housework, or school activities 1   Your usual hobbies, recreational, or sporting activities 1   Getting into or out of the bath 3   Walking between rooms 4   Putting on your shoes or socks 4   Squatting 1   Lifting an object, like a bag of groceries from the floor 2   Performing light activities around your home 2   Performing heavy activities around your home 0   Getting into or out of a car 4   Walking 2 blocks 0   Walking a mile 0   Going up or down 10 stairs (about 1 flight of stairs) 1   Standing for 1 hour 1   Sitting for 1 hour 1   Running on even ground  0   Running on uneven ground  0   Making sharp turns while running fast  0   Hopping 0   Rolling over in bed 2   LEFS Total Score 27   LEFS Disability Index 60-79%   LEFS CMS Modifier CL     Goals  Patient Goal: \"Walk without a limp, go down steps, and relieve pain at night\"  Short Term Goals - Within 4 weeks, patient will:  1. Report 0-1/10 pain at rest  2. Sleep through the night undisturbed by pain. 3. Demonstrate R Knee PROM 0-120 deg. 4. Ascend/Descend flight of stairs step-over-step.     Long Term Goals - Within 8 weeks, patient will:  1. Report 0-1/10 pain with ADLs. 2. Demonstrate HEP independently. 3. Demonstrate R Knee AROm 0-120 deg. 4. Return to work without restrictions.     Therapy Time   Individual Concurrent Group Co-treatment   Time In  950         Time Out  1100         Minutes  70 min               , PT [id number]   Chhaya Simpson, PT

## 2019-04-16 NOTE — FLOWSHEET NOTE
Physical Therapy Daily Treatment Note  Date:  2019    Patient Name:  Roberto Art    :  1955  MRN: [id number]  Restrictions/Precautions:  n/a  Diagnosis: W39.569 (ICD-10-CM) - Status post total right knee replacement  Treatment Diagnosis: R Knee Stiffness, Pain, due to s/p R TKR @3/30/88)  Insurance/Certification information:  PT Insurance Information: [facility]  Physician Information:  Referring Practitioner: Raisa Kong MD Follow-up:   Plan of care signed (Y/N): sent to inbox   Visit# / total visits: 1 /  Pain level: 9/10 worst, /10 best     Progress Note: [x]  Yes  []  No  Next due by: Visit #10      Subjective:  See Eval    Objective:  Observation: See Eval  Test measurements:  See Eval    Exercises: PT instructed patient in technique for all Therapeutic Ex. Exercise/Equipment Resistance/Repetitions Other comments   NuStep 5 min Seat at 9   Heel Slides 15 x 5\" hold    Gastroc Stretch with belt 10 x 10\"    Hamstring Stretch 10 x 10\"    Minisquats 15 x     Step ups 10 x onto 6\" step                   Neuro Facilitation:  Quad set with LAQ  Tap Downs  SL Balance   15 x 5\" hold  10 x Eccentric from 2 inch step  10 x 10\" hold with slight flexed knee                          Other Therapeutic Activities:  PT educated patient on stairs, ADL modification, progression back into PLOF, and long term expectations for knee. PT educated patient on POC and role of PT. Home Exercise Program:     19: See flow sheet    Manual Treatments:  n/a    Modalities:  n/a    Timed Code Treatment Minutes:  TE x 20 min, NR x 10 min, TA x 10 min    Total Treatment Minutes:  70 min (low complexity eval)    Treatment/Activity Tolerance:  [x] Patient tolerated treatment well [] Patient limited by fatigue  [] Patient limited by pain  [] Patient limited by other medical complications  [] Other:     Assessment:  Pt presents to PT with R knee pain, stiffness, and weakness due to R TKR.   Pt will benefit from skilled PT to reduce pain, improve ROM/Functional mobility, gait, and strength, so that the patient can return to work without restrictions and return to all PLOF. Prognosis: [x] Good [] Fair  [] Poor    Patient Requires Follow-up: [x] Yes  [] No    Goals:  Patient Goal: \"Walk without a limp, go down steps, and relieve pain at night\"  Short Term Goals - Within 4 weeks, patient will:  1. Report 0-1/10 pain at rest  2. Sleep through the night undisturbed by pain. 3. Demonstrate R Knee PROM 0-120 deg. 4. Ascend/Descend flight of stairs step-over-step.     Long Term Goals - Within 8 weeks, patient will:  1. Report 0-1/10 pain with ADLs. 2. Demonstrate HEP independently. 3. Demonstrate R Knee AROm 0-120 deg. 4. Return to work without restrictions. Plan:   [] Continue per plan of care [] Alter current plan (see comments)  [x] Plan of care initiated [] Hold pending MD visit [] Discharge    Plan for Next Session:  Progress as tolerated with standing/weightbearing exercises.      Electronically signed by: , PT [id number]       Bijal Grigsby

## 2019-04-18 ENCOUNTER — APPOINTMENT (OUTPATIENT)
Dept: PHYSICAL THERAPY | Age: 64
End: 2019-04-18
Payer: COMMERCIAL

## 2019-04-23 ENCOUNTER — HOSPITAL ENCOUNTER (OUTPATIENT)
Dept: PHYSICAL THERAPY | Age: 64
Setting detail: THERAPIES SERIES
Discharge: HOME | End: 2019-04-23
Payer: COMMERCIAL

## 2019-04-23 PROCEDURE — 97112 NEUROMUSCULAR REEDUCATION: CPT | Performed by: PHYSICAL THERAPIST

## 2019-04-23 PROCEDURE — 97110 THERAPEUTIC EXERCISES: CPT | Performed by: PHYSICAL THERAPIST

## 2019-04-24 NOTE — FLOWSHEET NOTE
Physical Therapy Daily Treatment Note  Date:  2019    Patient Name:  Bernardo Andrade    :  1955  MRN: [id number]  Restrictions/Precautions:  n/a  Diagnosis: Q82.559 (ICD-10-CM) - Status post total right knee replacement  Treatment Diagnosis: R Knee Stiffness, Pain, due to s/p R TKR @65)  Insurance/Certification information:  PT Insurance Information: [facility]  Physician Information:  Referring Practitioner: Sharyle Saber MD Follow-up:   Plan of care signed (Y/N): sent to inbox   Visit# / total visits: 2 /  Pain level: -5/10     Progress Note: [x]  Yes  []  No  Next due by: Visit #10      Subjective:  Pt reports doing well overall, but still having a lot of difficulty going down stairs and walking downhill. Patient missed last week due to being out of town. Objective:  Observation:   Test measurements:  PROM Knee Ext lacking approximately 5 deg. PROM Knee Flex 116 deg. Exercises: PT instructed patient in technique for all Therapeutic Ex.   Exercise/Equipment Resistance/Repetitions Other comments   NuStep 5 min Seat at 9   Heel Slides 15 x 5\" hold    Gastroc Stretch with belt 10 x 10\"    Hamstring Stretch 10 x 10\"    Minisquats 15 x     Step ups 10 x onto 6\" step    Forward Lunges 15 x 3\" hold with R foot forward    Wall Sit 10 x 10\" hold, toes up for VMO contraction         Neuro Facilitation:  Quad set with LAQ  Tap Downs   15 x 5\" hold, slow eccentric return  10 x Eccentric from 2 inch step                          Other Therapeutic Activities:  n/a    Home Exercise Program:     19: See flow sheet    Manual Treatments: Passive R knee ext, with heel propped on towel roll    Modalities:  CP for 10 min (unbilled) for pain control    Timed Code Treatment Minutes:  TE x 30 min, NR x 10 min, MT x 5 min    Total Treatment Minutes:  45 min, (10 min un-billed CP after session)    Treatment/Activity Tolerance:  [x] Patient tolerated treatment well [] Patient limited by fatigue  [] Patient limited by pain  [] Patient limited by other medical complications  [] Other:     Assessment:  Pt presents to PT with R knee pain, stiffness, and weakness due to R TKR. Pt will benefit from skilled PT to reduce pain, improve ROM/Functional mobility, gait, and strength, so that the patient can return to work without restrictions and return to all PLOF. Prognosis: [x] Good [] Fair  [] Poor    Patient Requires Follow-up: [x] Yes  [] No    Goals:  Patient Goal: \"Walk without a limp, go down steps, and relieve pain at night\"  Short Term Goals - Within 4 weeks, patient will:  1. Report 0-1/10 pain at rest  2. Sleep through the night undisturbed by pain. 3. Demonstrate R Knee PROM 0-120 deg. 4. Ascend/Descend flight of stairs step-over-step.     Long Term Goals - Within 8 weeks, patient will:  1. Report 0-1/10 pain with ADLs. 2. Demonstrate HEP independently. 3. Demonstrate R Knee AROm 0-120 deg. 4. Return to work without restrictions. Plan:   [x] Continue per plan of care [] Alter current plan (see comments)  [] Plan of care initiated [] Hold pending MD visit [] Discharge    Plan for Next Session:  Progress as tolerated with standing/weightbearing exercises. Eccentric focus.     Electronically signed by: , PT [id number]       Thiago Pagan

## 2019-04-25 ENCOUNTER — HOSPITAL ENCOUNTER (OUTPATIENT)
Dept: PHYSICAL THERAPY | Age: 64
Setting detail: THERAPIES SERIES
Discharge: HOME | End: 2019-04-25
Payer: COMMERCIAL

## 2019-04-25 PROCEDURE — 97110 THERAPEUTIC EXERCISES: CPT | Performed by: PHYSICAL THERAPIST

## 2019-04-25 PROCEDURE — 97140 MANUAL THERAPY 1/> REGIONS: CPT | Performed by: PHYSICAL THERAPIST

## 2019-04-25 NOTE — FLOWSHEET NOTE
Tolerance:  [x] Patient tolerated treatment well [] Patient limited by fatigue  [] Patient limited by pain  [] Patient limited by other medical complications  [] Other:     Assessment:  Pt presents to PT with R knee pain, stiffness, and weakness due to R TKR. Pt will benefit from skilled PT to reduce pain, improve ROM/Functional mobility, gait, and strength, so that the patient can return to work without restrictions and return to all PLOF. Prognosis: [x] Good [] Fair  [] Poor    Patient Requires Follow-up: [x] Yes  [] No    Goals:  Patient Goal: \"Walk without a limp, go down steps, and relieve pain at night\"  Short Term Goals - Within 4 weeks, patient will:  1. Report 0-1/10 pain at rest  2. Sleep through the night undisturbed by pain. 3. Demonstrate R Knee PROM 0-120 deg. 4. Ascend/Descend flight of stairs step-over-step.     Long Term Goals - Within 8 weeks, patient will:  1. Report 0-1/10 pain with ADLs. 2. Demonstrate HEP independently. 3. Demonstrate R Knee AROm 0-120 deg. 4. Return to work without restrictions. Plan:   [x] Continue per plan of care [] Alter current plan (see comments)  [] Plan of care initiated [] Hold pending MD visit [] Discharge    Plan for Next Session:  Progress as tolerated with standing/weightbearing exercises. Eccentric focus.     Electronically signed by: , PT [id number]       Kris Simon

## 2019-04-29 ENCOUNTER — OFFICE VISIT (OUTPATIENT)
Dept: ORTHOPEDIC SURGERY | Age: 64
End: 2019-04-29

## 2019-04-29 VITALS — BODY MASS INDEX: 33.43 KG/M2 | TEMPERATURE: 97.9 F | WEIGHT: 213 LBS | HEIGHT: 67 IN

## 2019-04-29 DIAGNOSIS — Z96.651 STATUS POST TOTAL RIGHT KNEE REPLACEMENT: ICD-10-CM

## 2019-04-29 DIAGNOSIS — M17.11 ARTHRITIS OF RIGHT KNEE: ICD-10-CM

## 2019-04-29 DIAGNOSIS — Z96.651 STATUS POST TOTAL RIGHT KNEE REPLACEMENT: Primary | ICD-10-CM

## 2019-04-29 PROCEDURE — 99024 POSTOP FOLLOW-UP VISIT: CPT | Performed by: ORTHOPAEDIC SURGERY

## 2019-04-29 NOTE — PROGRESS NOTES
This dictation was done with [employer] dictation and may contain mechanical errors related to translation. Temperature 97.9 °F (36.6 °C), temperature source Temporal, height 5' 7\" (1.702 m), weight 213 lb (96.6 kg), currently breastfeeding.

## 2019-04-30 ENCOUNTER — HOSPITAL ENCOUNTER (OUTPATIENT)
Dept: PHYSICAL THERAPY | Age: 64
Setting detail: THERAPIES SERIES
Discharge: HOME | End: 2019-04-30
Payer: COMMERCIAL

## 2019-04-30 RX ORDER — OXYCODONE HYDROCHLORIDE AND ACETAMINOPHEN 5; 325 MG/1; MG/1
1 TABLET ORAL EVERY 8 HOURS PRN
Qty: 21 TABLET | Refills: 0 | Status: SHIPPED | OUTPATIENT
Start: 2019-04-30 | End: 2019-05-07

## 2019-05-02 ENCOUNTER — HOSPITAL ENCOUNTER (OUTPATIENT)
Dept: PHYSICAL THERAPY | Age: 64
Setting detail: THERAPIES SERIES
Discharge: HOME | End: 2019-05-02
Payer: COMMERCIAL

## 2019-05-02 PROCEDURE — 97110 THERAPEUTIC EXERCISES: CPT | Performed by: PHYSICAL THERAPIST

## 2019-05-02 NOTE — FLOWSHEET NOTE
Physical Therapy Daily Treatment Note  Date:  2019    Patient Name:  Mark Pace    :  1955  MRN: [id number]  Restrictions/Precautions:  n/a  Diagnosis: X53.653 (ICD-10-CM) - Status post total right knee replacement  Treatment Diagnosis: R Knee Stiffness, Pain, due to s/p R TKR @)  Insurance/Certification information:  PT Insurance Information: [facility]  Physician Information:  Referring Practitioner: Rosa Ramachandran MD Follow-up:  19  Plan of care signed (Y/N): YES   Visit# / total visits: 7 /  Pain level:  0-1/10 at rest,  Up to 7/10 after exercise sessions. Progress Note: [x]  Yes  []  No  Next due by: Visit #10      Subjective:  Pt states that she lifted her 70+ pound dog and strained her low back a couple days ago. Pt states that her knee feels good and that she is seeing improvements with functional activities including going down stairs and walking down a decline. Objective:  Observation: Mild antalgic gait pattern, which may partially be due to comorbidities involving feet. Test measurements:  R Knee Ext lacking 2-3 deg;  R Knee Flex 120 deg     Exercises: PT instructed patient in technique for all Therapeutic Ex.   Exercise/Equipment Resistance/Repetitions Other comments   NuStep 5 min Seat at 9   Heel Slides 15 x 5\" hold    Gastroc Stretch with belt 10 x 10\"    Hamstring Stretch 10 x 10\"    Minisquats 15 x     Step ups 15 x onto 6\" step    Forward Lunges 15 x 3\" hold with R foot forward    Wall Sit 10 x 10\" hold, toes up for VMO contraction         Neuro Facilitation:  Quad set with LAQ  Tap Downs   15 x 5\" hold, slow eccentric return  15 x Eccentric from 6 inch step     PT cues to maintain level hips during tap downs                         Other Therapeutic Activities:  n/a    Home Exercise Program:     19: See flow sheet    Manual Treatments: Passive R knee ext, with heel propped on towel roll  Modalities:  n/a    Timed Code Treatment Minutes:  TE x 30 min, MT x 2 min  Total Treatment Minutes:   32 min    Treatment/Activity Tolerance:  [x] Patient tolerated treatment well [] Patient limited by fatigue  [] Patient limited by pain  [] Patient limited by other medical complications  [] Other:     Assessment:  Pt presents to PT with R knee pain, stiffness, and weakness due to R TKR. Pt will benefit from skilled PT to reduce pain, improve ROM/Functional mobility, gait, and strength, so that the patient can return to work without restrictions and return to all PLOF. Prognosis: [x] Good [] Fair  [] Poor    Patient Requires Follow-up: [x] Yes  [] No    Goals:  Patient Goal: \"Walk without a limp, go down steps, and relieve pain at night\"  Short Term Goals - Within 4 weeks, patient will:  1. Report 0-1/10 pain at rest  2. Sleep through the night undisturbed by pain. 3. Demonstrate R Knee PROM 0-120 deg. 4. Ascend/Descend flight of stairs step-over-step.     Long Term Goals - Within 8 weeks, patient will:  1. Report 0-1/10 pain with ADLs. 2. Demonstrate HEP independently. 3. Demonstrate R Knee AROm 0-120 deg. 4. Return to work without restrictions. Plan:   [x] Continue per plan of care [] Alter current plan (see comments)  [] Plan of care initiated [] Hold pending MD visit [] Discharge    Plan for Next Session:  Progress as tolerated with standing/weightbearing exercises. Eccentric focus.     Electronically signed by: , PT [id number]       Elisha Gaytan

## 2019-05-05 NOTE — PROGRESS NOTES
Patient is 80-year-old female status post right total knee arthroplasty on 2/27/2019. She is now 2 months postop and she is here for further evaluation and follow-up. Patient states she's doing well walking without any ambulatory aid etc. However she does describe pain at 8 out of 10 after physical therapy and at night. She had an uneventful postoperative course and she has satisfactory radiographs of her total knee arthroplasty. Physical examination 80-year-old female oriented ×3 temperature is 97.9. Examination of the right knee shows a moderate effusion -3-5° of full extension to 125° of flexion. No signs of instability or deep sepsis are noted. Distal neurovascular examination is intact to the right foot and ankle. X-rays obtained AP lateral patellofemoral view of the right knee show status post cemented right total knee arthroplasty. Stable overall orientation and alignment with no signs of acute loosening or failure noted. Good patellofemoral mechanics are seen and no other obvious fractures tumors or dislocations are noted on these x-rays. Impression 80-year-old female stable status post right total knee arthroplasty. Patient does complain of pain in rates it pretty high around therapy and some difficulty sleeping. We will suggest further use of anti-inflammatories ice and exercising. We will follow her up in 3-6 months or p.r.n.

## 2019-05-07 ENCOUNTER — HOSPITAL ENCOUNTER (OUTPATIENT)
Dept: PHYSICAL THERAPY | Age: 64
Setting detail: THERAPIES SERIES
Discharge: HOME | End: 2019-05-07
Payer: COMMERCIAL

## 2019-05-07 NOTE — PATIENT CARE CONFERENCE
Physical Therapy  Cancellation/No-show Note  Patient Name:  Wild Benítez  :  1955   Date:  2019  MRN: [id number]  Cancelled visits to date: 3  No-shows to date: 0    For today's appointment patient:  [x]  Cancelled  []  Rescheduled appointment  []  No-show     Reason given by patient:  []  Patient ill  [x]  Conflicting appointment  []  No transportation    []  Conflict with work  []  No reason given  []  Other:     Comments:      Electronically signed by:  Tata Contreras PT

## 2019-05-09 ENCOUNTER — APPOINTMENT (OUTPATIENT)
Dept: PHYSICAL THERAPY | Age: 64
End: 2019-05-09
Payer: COMMERCIAL

## 2019-05-16 ENCOUNTER — APPOINTMENT (OUTPATIENT)
Dept: PHYSICAL THERAPY | Age: 64
End: 2019-05-16
Payer: COMMERCIAL

## 2019-05-21 ENCOUNTER — APPOINTMENT (OUTPATIENT)
Dept: PHYSICAL THERAPY | Age: 64
End: 2019-05-21
Payer: COMMERCIAL

## 2019-05-23 ENCOUNTER — APPOINTMENT (OUTPATIENT)
Dept: PHYSICAL THERAPY | Age: 64
End: 2019-05-23
Payer: COMMERCIAL

## 2019-06-07 ENCOUNTER — TELEPHONE (OUTPATIENT)
Dept: ORTHOPEDIC SURGERY | Age: 64
End: 2019-06-07

## 2019-06-07 RX ORDER — CEPHALEXIN 500 MG/1
CAPSULE ORAL
Qty: 8 CAPSULE | Refills: 1 | Status: SHIPPED | OUTPATIENT
Start: 2019-06-07 | End: 2020-10-16 | Stop reason: ALTCHOICE

## 2019-06-07 NOTE — TELEPHONE ENCOUNTER
Patient broke her tooth and will be going to the dentist.  She will need prophylactic abx. [facility]/pharmacy #[id number] - [place], [place] - [address].  - P [phone] - F [phone]

## 2019-07-29 ENCOUNTER — OFFICE VISIT (OUTPATIENT)
Dept: ORTHOPEDIC SURGERY | Age: 64
End: 2019-07-29
Payer: COMMERCIAL

## 2019-07-29 VITALS — BODY MASS INDEX: 33.43 KG/M2 | WEIGHT: 213 LBS | RESPIRATION RATE: 16 BRPM | HEIGHT: 67 IN | TEMPERATURE: 97.6 F

## 2019-07-29 DIAGNOSIS — Z96.651 STATUS POST TOTAL RIGHT KNEE REPLACEMENT: Primary | ICD-10-CM

## 2019-07-29 PROCEDURE — 99213 OFFICE O/P EST LOW 20 MIN: CPT | Performed by: ORTHOPAEDIC SURGERY

## 2019-07-29 PROCEDURE — G8427 DOCREV CUR MEDS BY ELIG CLIN: HCPCS | Performed by: ORTHOPAEDIC SURGERY

## 2019-07-29 PROCEDURE — 1036F TOBACCO NON-USER: CPT | Performed by: ORTHOPAEDIC SURGERY

## 2019-07-29 PROCEDURE — 3017F COLORECTAL CA SCREEN DOC REV: CPT | Performed by: ORTHOPAEDIC SURGERY

## 2019-07-29 PROCEDURE — G8417 CALC BMI ABV UP PARAM F/U: HCPCS | Performed by: ORTHOPAEDIC SURGERY

## 2019-08-02 NOTE — PROGRESS NOTES
Patient is a 61-year-old female status post right total knee arthroplasty performed 2/27/2019. She is now about 5 months postop and is states she is doing okay except she has waking back pain rating it up to 8 out of 10 and complains of occasional aching. She is here for mid early follow-up. There is been no new history of injury or surgery. Patient Active Problem List   Diagnosis    Obesity    Hypertension    Arthritis of right knee    Primary osteoarthritis of right knee    Arthritis of right knee    Status post total right knee replacement-2.27.2019     Prior to Visit Medications    Medication Sig Taking? Authorizing Provider   cephALEXin (KEFLEX) 500 MG capsule 2 tablets by mouth 1 hour before and 1 hour after procedure  Kina Sandhu MD   aspirin EC 81 MG EC tablet Take 1 tablet by mouth 2 times daily Please avoid missing doses. Take for DVT blood clot prophylaxis  Harsha Benavidez, APRN - CNP   losartan-hydrochlorothiazide (HYZAAR) 100-12.5 MG per tablet Take 1 tablet by mouth daily  Historical Provider, MD   gabapentin (NEURONTIN) 300 MG capsule TAKE 1 CAP BY MOUTH EVERY EVENING  Historical Provider, MD   nitroGLYCERIN (NITROSTAT) 0.4 MG SL tablet Place 0.4 mg under the tongue  Historical Provider, MD   metoprolol tartrate (LOPRESSOR) 50 MG tablet TAKE 1 TABLET TWICE A DAY  Historical Provider, MD   levothyroxine (SYNTHROID) 75 MCG tablet Take 112.5 mcg by mouth Daily   Historical Provider, MD     Physical examination 61-year-old female oriented x3 temperature is 97.6. Examination of her back shows decreased range of motion but no specific pain tenderness or instability. Motor exam to the right lower extremity shows quadriceps hamstrings hip abductors and abductors foot plantar dorsiflexors are intact 4-5 over 5. Sensation and perfusion are intact to her right foot and ankle. There is no numbness or tingling noted in the right lower extremity.   Examination of the right knee shows a

## 2019-12-19 ENCOUNTER — OFFICE VISIT (OUTPATIENT)
Dept: ORTHOPEDIC SURGERY | Age: 64
End: 2019-12-19
Payer: COMMERCIAL

## 2019-12-19 VITALS
BODY MASS INDEX: 33.43 KG/M2 | TEMPERATURE: 97.2 F | WEIGHT: 213 LBS | SYSTOLIC BLOOD PRESSURE: 175 MMHG | DIASTOLIC BLOOD PRESSURE: 93 MMHG | HEIGHT: 67 IN | HEART RATE: 67 BPM

## 2019-12-19 DIAGNOSIS — Z96.651 STATUS POST TOTAL RIGHT KNEE REPLACEMENT: Primary | ICD-10-CM

## 2019-12-19 PROCEDURE — G8417 CALC BMI ABV UP PARAM F/U: HCPCS | Performed by: ORTHOPAEDIC SURGERY

## 2019-12-19 PROCEDURE — 1036F TOBACCO NON-USER: CPT | Performed by: ORTHOPAEDIC SURGERY

## 2019-12-19 PROCEDURE — G8484 FLU IMMUNIZE NO ADMIN: HCPCS | Performed by: ORTHOPAEDIC SURGERY

## 2019-12-19 PROCEDURE — 99213 OFFICE O/P EST LOW 20 MIN: CPT | Performed by: ORTHOPAEDIC SURGERY

## 2019-12-19 PROCEDURE — G8427 DOCREV CUR MEDS BY ELIG CLIN: HCPCS | Performed by: ORTHOPAEDIC SURGERY

## 2019-12-19 PROCEDURE — 3017F COLORECTAL CA SCREEN DOC REV: CPT | Performed by: ORTHOPAEDIC SURGERY

## 2020-03-25 PROBLEM — M17.11 ARTHRITIS OF RIGHT KNEE: Status: RESOLVED | Noted: 2018-10-09 | Resolved: 2020-03-24

## 2020-04-01 ENCOUNTER — OFFICE VISIT (OUTPATIENT)
Dept: ORTHOPEDIC SURGERY | Age: 65
End: 2020-04-01
Payer: COMMERCIAL

## 2020-04-01 VITALS — HEIGHT: 67 IN | BODY MASS INDEX: 33.36 KG/M2 | TEMPERATURE: 98.6 F

## 2020-04-01 PROBLEM — M19.079 ARTHRITIS OF MIDFOOT: Status: ACTIVE | Noted: 2020-04-01

## 2020-04-01 PROCEDURE — G8417 CALC BMI ABV UP PARAM F/U: HCPCS | Performed by: ORTHOPAEDIC SURGERY

## 2020-04-01 PROCEDURE — 1036F TOBACCO NON-USER: CPT | Performed by: ORTHOPAEDIC SURGERY

## 2020-04-01 PROCEDURE — 99213 OFFICE O/P EST LOW 20 MIN: CPT | Performed by: ORTHOPAEDIC SURGERY

## 2020-04-01 PROCEDURE — G8427 DOCREV CUR MEDS BY ELIG CLIN: HCPCS | Performed by: ORTHOPAEDIC SURGERY

## 2020-04-01 PROCEDURE — 3017F COLORECTAL CA SCREEN DOC REV: CPT | Performed by: ORTHOPAEDIC SURGERY

## 2020-04-01 NOTE — PROGRESS NOTES
EXAMINATION:  Ms. Eric Ang is a very pleasant 59 y.o.  female who presents today in no acute distress, awake, alert, and oriented. She is well dressed, nourished and  groomed. Patient with normal affect. Height is  5' 7\" (1.702 m), weight is  , Body mass index is 33.36 kg/m². Resting respiratory rate is 16. Examination of the gait, showed that the patient walks heel-toe with minimal limp.  Examination of both ankles showing dorsiflexion to about 10 degrees bilaterally, which increased with knee flexion. She has intact sensation and good pedal pulses.  She has good strength in all four planes, including eversion, and has tenderness on deep palpation over the right midfoot, with prominent screw left foot and osteophytes right foot compared to the other side.  The ankles are stable to drawer test bilaterally, equally.      IMAGING:  Xray's were reviewed, 3 views of the bilateral foot taken in office today, and showed no acute fracture. Midfoot arthritis affecting mainly the right 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 joints. Left foot with plate 2nd TMT with one screw backed up and another with broken tip and nonunion of 2nd TMT fusion. CT scan left foot dated 3/31/2020 from [facility] was reviewed and showed a non union 2nd TMT arthrodesis. IMPRESSION:   1- Right midfoot pain/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 arthritis. 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 PLAN: I discussed with the patient the treatment options. We recommended stretching exercises of the calf which was taught to the patient today. She will take NSAIDS PRN. I believe she will benefit from removal of the prominent screw as it may erode through the skin. In the future she may need revision arthrodesis left 2nd TMT along with 3rd TMT fusion, and also right midfoot 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 fusion.     I discussed the risks and benefits of surgery with the patient, including but not limited to infection, bleeding, pain, injury to nerves or blood vessels failure of the surgery and need for additional surgery. All the patient's questions were answered. We discussed an expected post-operative course. She  is understanding of this and wishes to proceed.        Darin Franks MD

## 2020-04-02 ENCOUNTER — ANESTHESIA EVENT (OUTPATIENT)
Dept: OPERATING ROOM | Age: 65
End: 2020-04-02
Payer: COMMERCIAL

## 2020-04-02 RX ORDER — CEPHALEXIN 500 MG/1
500 CAPSULE ORAL 4 TIMES DAILY
Qty: 20 CAPSULE | Refills: 0 | Status: SHIPPED | OUTPATIENT
Start: 2020-04-02 | End: 2020-10-16 | Stop reason: ALTCHOICE

## 2020-04-02 RX ORDER — DIAZEPAM 5 MG/1
TABLET ORAL
Qty: 2 TABLET | Refills: 0 | Status: SHIPPED | OUTPATIENT
Start: 2020-04-03 | End: 2020-04-03

## 2020-04-02 NOTE — PROGRESS NOTES
Name_______________________________________Printed:____________________  Date and time of surgery___4/3/2020  1400_____________________Arrival Time:___1230  main_____________   1. The instructions given regarding when and if a patient needs to stop oral intake prior to surgery varies. Follow the specific instructions you were given                  _XXX__Nothing to eat or to drink after Midnight the night before.                             ____Endoscopy patient follow your DRS instructions-generally you will be doing a part of the prep after Midnight                   ____Carbo loading or ERAS instructions will be given to select patients-if you have been given those instructions -please do the following                           The evening before your surgery after dinner before midnight drink 40 ounces of gatorade. If you are diabetic use sugar free. The morning of surgery drink 40 ounces of water. This needs to be finished 3 hours prior to your surgery start time. 2. Take the following pills with a small sip of water on the morning of surgery_________metoprolol, levothyroxine, keflex per Dr. Luke Cuevas instructions__________________________________________                  Do not take blood pressure medications ending in pril or sartan the bing prior to surgery or the morning of surgery-DO NOT TAKE HYZAAR   3. Aspirin, Ibuprofen, Advil, Naproxen, Vitamin E and other Anti-inflammatory products and supplements should be stopped for 5 -7days before surgery or as directed by your physician. 4. Check with your Doctor regarding stopping Plavix, Coumadin,Eliquis, Lovenox,Effient,Pradaxa,Xarelto, Fragmin or other blood thinners and follow their instructions. 5. Do not smoke, and do not drink any alcoholic beverages 24 hours prior to surgery. This includes NA Beer. Refrain from the usage of any recreational drugs. 6. You may brush your teeth and gargle the morning of surgery. DO NOT SWALLOW WATER   7.  You MUST make arrangements for a responsible adult to stay on site while you are here and take you home after your surgery. You will not be allowed to leave alone or drive yourself home. It is strongly suggested someone stay with you the first 24 hrs. Your surgery will be cancelled if you do not have a ride home. 8. A parent/legal guardian must accompany a child scheduled for surgery and plan to stay at the hospital until the child is discharged. Please do not bring other children with you. 9. Please wear simple, loose fitting clothing to the hospital.  Anisha Ordonez not bring valuables (money, credit cards, checkbooks, etc.) Do not wear any makeup (including no eye makeup) or nail polish on your fingers or toes. 10. DO NOT wear any jewelry or piercings on day of surgery. All body piercing jewelry must be removed. 11. If you have ___dentures, they will be removed before going to the OR; we will provide you a container. If you wear ___contact lenses or ___glasses, they will be removed; please bring a case for them. 12. Please see your family doctor/pediatrician for a history & physical and/or concerning medications. Bring any test results/reports from your physician's office. PCP__________________Phone___________H&P Appt. Date________             13 If you  have a Living Will and Durable Power of  for Healthcare, please bring in a copy. 15. Notify your Surgeon if you develop any illness between now and surgery  time, cough, cold, fever, sore throat, nausea, vomiting, etc.  Please notify your surgeon if you experience dizziness, shortness of breath or blurred vision between now & the time of your surgery             15. DO NOT shave your operative site 96 hours prior to surgery. For face & neck surgery, men may use an electric razor 48 hours prior to surgery.              16. Shower the night before or morning of surgery using an antibacterial soap or as you have been instructed. 17. To provide excellent care visitors will be limited to one in the room at any given time. 18.  Please bring picture ID and insurance card. 19.  Visit our web site for additional information:  [email or web address]              20.During flu season no children under the age of 15 are permitted in the hospital for the safety of all patients. 21. If you take a long acting insulin in the evening only  take half of your usual  dose the night  before your procedure              22. If you use a c-pap please bring DOS if staying overnight,             23.For your convenience [address] [place] [address] has a pharmacy on site to fill your prescriptions. 24. If you use oxygen and have a portable tank please bring it  with you the DOS             25. Bring a complete list of all your medications with name and dose include any supplements. 26. Other__________________________________________   *Please call pre admission testing if you any further questions   [facility] [address] [place] [address]. [facility]  [phone]   [address] [place] [address]       All above information reviewed with patient in person or by phone. Patient verbalizes understanding. All questions and concerns addressed.                                                                                                  Patient/Rep____________________                                                                                                                                    PRE OP INSTRUCTIONS

## 2020-04-03 ENCOUNTER — HOSPITAL ENCOUNTER (OUTPATIENT)
Age: 65
Setting detail: OUTPATIENT SURGERY
Discharge: HOME | End: 2020-04-03
Attending: ORTHOPAEDIC SURGERY | Admitting: ORTHOPAEDIC SURGERY
Payer: COMMERCIAL

## 2020-04-03 ENCOUNTER — ANESTHESIA (OUTPATIENT)
Dept: OPERATING ROOM | Age: 65
End: 2020-04-03
Payer: COMMERCIAL

## 2020-04-03 VITALS
SYSTOLIC BLOOD PRESSURE: 139 MMHG | TEMPERATURE: 97.3 F | BODY MASS INDEX: 35.74 KG/M2 | HEIGHT: 67 IN | WEIGHT: 227.7 LBS | HEART RATE: 60 BPM | DIASTOLIC BLOOD PRESSURE: 75 MMHG | RESPIRATION RATE: 19 BRPM | OXYGEN SATURATION: 100 %

## 2020-04-03 VITALS — OXYGEN SATURATION: 98 % | DIASTOLIC BLOOD PRESSURE: 74 MMHG | SYSTOLIC BLOOD PRESSURE: 143 MMHG

## 2020-04-03 LAB
ANION GAP SERPL CALCULATED.3IONS-SCNC: 11 MMOL/L (ref 3–16)
BUN BLDV-MCNC: 15 MG/DL (ref 7–20)
CALCIUM SERPL-MCNC: 9.9 MG/DL (ref 8.3–10.6)
CHLORIDE BLD-SCNC: 99 MMOL/L (ref 99–110)
CO2: 26 MMOL/L (ref 21–32)
CREAT SERPL-MCNC: 0.6 MG/DL (ref 0.6–1.2)
GFR AFRICAN AMERICAN: >60
GFR NON-AFRICAN AMERICAN: >60
GLUCOSE BLD-MCNC: 98 MG/DL (ref 70–99)
POTASSIUM SERPL-SCNC: 4.8 MMOL/L (ref 3.5–5.1)
SODIUM BLD-SCNC: 136 MMOL/L (ref 136–145)

## 2020-04-03 PROCEDURE — 7100000010 HC PHASE II RECOVERY - FIRST 15 MIN: Performed by: ORTHOPAEDIC SURGERY

## 2020-04-03 PROCEDURE — 7100000000 HC PACU RECOVERY - FIRST 15 MIN: Performed by: ORTHOPAEDIC SURGERY

## 2020-04-03 PROCEDURE — 3600000003 HC SURGERY LEVEL 3 BASE: Performed by: ORTHOPAEDIC SURGERY

## 2020-04-03 PROCEDURE — 6360000002 HC RX W HCPCS: Performed by: NURSE ANESTHETIST, CERTIFIED REGISTERED

## 2020-04-03 PROCEDURE — 2709999900 HC NON-CHARGEABLE SUPPLY: Performed by: ORTHOPAEDIC SURGERY

## 2020-04-03 PROCEDURE — 2500000003 HC RX 250 WO HCPCS: Performed by: ORTHOPAEDIC SURGERY

## 2020-04-03 PROCEDURE — 88300 SURGICAL PATH GROSS: CPT

## 2020-04-03 PROCEDURE — 3600000013 HC SURGERY LEVEL 3 ADDTL 15MIN: Performed by: ORTHOPAEDIC SURGERY

## 2020-04-03 PROCEDURE — 6360000002 HC RX W HCPCS: Performed by: ORTHOPAEDIC SURGERY

## 2020-04-03 PROCEDURE — 7100000011 HC PHASE II RECOVERY - ADDTL 15 MIN: Performed by: ORTHOPAEDIC SURGERY

## 2020-04-03 PROCEDURE — 7100000001 HC PACU RECOVERY - ADDTL 15 MIN: Performed by: ORTHOPAEDIC SURGERY

## 2020-04-03 PROCEDURE — 20680 REMOVAL OF IMPLANT DEEP: CPT | Performed by: ORTHOPAEDIC SURGERY

## 2020-04-03 PROCEDURE — 80048 BASIC METABOLIC PNL TOTAL CA: CPT

## 2020-04-03 PROCEDURE — 3700000001 HC ADD 15 MINUTES (ANESTHESIA): Performed by: ORTHOPAEDIC SURGERY

## 2020-04-03 PROCEDURE — 36415 COLL VENOUS BLD VENIPUNCTURE: CPT

## 2020-04-03 PROCEDURE — 3700000000 HC ANESTHESIA ATTENDED CARE: Performed by: ORTHOPAEDIC SURGERY

## 2020-04-03 RX ORDER — PROCHLORPERAZINE EDISYLATE 5 MG/ML
5 INJECTION INTRAMUSCULAR; INTRAVENOUS
Status: DISCONTINUED | OUTPATIENT
Start: 2020-04-03 | End: 2020-04-03 | Stop reason: HOSPADM

## 2020-04-03 RX ORDER — M-VIT,TX,IRON,MINS/CALC/FOLIC 27MG-0.4MG
1 TABLET ORAL DAILY
COMMUNITY

## 2020-04-03 RX ORDER — MIDAZOLAM HYDROCHLORIDE 1 MG/ML
INJECTION INTRAMUSCULAR; INTRAVENOUS PRN
Status: DISCONTINUED | OUTPATIENT
Start: 2020-04-03 | End: 2020-04-03 | Stop reason: SDUPTHER

## 2020-04-03 RX ORDER — SODIUM CHLORIDE 0.9 % (FLUSH) 0.9 %
10 SYRINGE (ML) INJECTION EVERY 12 HOURS SCHEDULED
Status: DISCONTINUED | OUTPATIENT
Start: 2020-04-03 | End: 2020-04-03 | Stop reason: HOSPADM

## 2020-04-03 RX ORDER — LIDOCAINE HYDROCHLORIDE 10 MG/ML
1 INJECTION, SOLUTION EPIDURAL; INFILTRATION; INTRACAUDAL; PERINEURAL
Status: DISCONTINUED | OUTPATIENT
Start: 2020-04-03 | End: 2020-04-03 | Stop reason: HOSPADM

## 2020-04-03 RX ORDER — FENTANYL CITRATE 50 UG/ML
INJECTION, SOLUTION INTRAMUSCULAR; INTRAVENOUS PRN
Status: DISCONTINUED | OUTPATIENT
Start: 2020-04-03 | End: 2020-04-03 | Stop reason: SDUPTHER

## 2020-04-03 RX ORDER — HYDRALAZINE HYDROCHLORIDE 20 MG/ML
5 INJECTION INTRAMUSCULAR; INTRAVENOUS EVERY 10 MIN PRN
Status: DISCONTINUED | OUTPATIENT
Start: 2020-04-03 | End: 2020-04-03 | Stop reason: HOSPADM

## 2020-04-03 RX ORDER — LIDOCAINE HYDROCHLORIDE 10 MG/ML
INJECTION, SOLUTION EPIDURAL; INFILTRATION; INTRACAUDAL; PERINEURAL
Status: COMPLETED | OUTPATIENT
Start: 2020-04-03 | End: 2020-04-03

## 2020-04-03 RX ORDER — OXYCODONE HYDROCHLORIDE AND ACETAMINOPHEN 5; 325 MG/1; MG/1
1 TABLET ORAL
Status: DISCONTINUED | OUTPATIENT
Start: 2020-04-03 | End: 2020-04-03 | Stop reason: HOSPADM

## 2020-04-03 RX ORDER — MULTIVIT-MIN/IRON/FOLIC ACID/K 18-600-40
CAPSULE ORAL DAILY
COMMUNITY

## 2020-04-03 RX ORDER — LABETALOL HYDROCHLORIDE 5 MG/ML
5 INJECTION, SOLUTION INTRAVENOUS EVERY 10 MIN PRN
Status: DISCONTINUED | OUTPATIENT
Start: 2020-04-03 | End: 2020-04-03 | Stop reason: HOSPADM

## 2020-04-03 RX ORDER — HYDROMORPHONE HCL 110MG/55ML
0.5 PATIENT CONTROLLED ANALGESIA SYRINGE INTRAVENOUS EVERY 5 MIN PRN
Status: DISCONTINUED | OUTPATIENT
Start: 2020-04-03 | End: 2020-04-03 | Stop reason: HOSPADM

## 2020-04-03 RX ORDER — ONDANSETRON 2 MG/ML
4 INJECTION INTRAMUSCULAR; INTRAVENOUS
Status: DISCONTINUED | OUTPATIENT
Start: 2020-04-03 | End: 2020-04-03 | Stop reason: HOSPADM

## 2020-04-03 RX ORDER — PROPOFOL 10 MG/ML
INJECTION, EMULSION INTRAVENOUS PRN
Status: DISCONTINUED | OUTPATIENT
Start: 2020-04-03 | End: 2020-04-03 | Stop reason: SDUPTHER

## 2020-04-03 RX ORDER — FENTANYL CITRATE 50 UG/ML
25 INJECTION, SOLUTION INTRAMUSCULAR; INTRAVENOUS EVERY 5 MIN PRN
Status: DISCONTINUED | OUTPATIENT
Start: 2020-04-03 | End: 2020-04-03 | Stop reason: HOSPADM

## 2020-04-03 RX ORDER — SODIUM CHLORIDE, SODIUM LACTATE, POTASSIUM CHLORIDE, CALCIUM CHLORIDE 600; 310; 30; 20 MG/100ML; MG/100ML; MG/100ML; MG/100ML
INJECTION, SOLUTION INTRAVENOUS CONTINUOUS
Status: DISCONTINUED | OUTPATIENT
Start: 2020-04-03 | End: 2020-04-03 | Stop reason: HOSPADM

## 2020-04-03 RX ORDER — SODIUM CHLORIDE 0.9 % (FLUSH) 0.9 %
10 SYRINGE (ML) INJECTION PRN
Status: DISCONTINUED | OUTPATIENT
Start: 2020-04-03 | End: 2020-04-03 | Stop reason: HOSPADM

## 2020-04-03 RX ADMIN — MIDAZOLAM 2 MG: 1 INJECTION INTRAMUSCULAR; INTRAVENOUS at 16:45

## 2020-04-03 RX ADMIN — CEFAZOLIN 2 G: 1 INJECTION, POWDER, FOR SOLUTION INTRAMUSCULAR; INTRAVENOUS at 16:40

## 2020-04-03 RX ADMIN — FENTANYL CITRATE 50 MCG: 50 INJECTION, SOLUTION INTRAMUSCULAR; INTRAVENOUS at 16:47

## 2020-04-03 RX ADMIN — FENTANYL CITRATE 25 MCG: 50 INJECTION, SOLUTION INTRAMUSCULAR; INTRAVENOUS at 17:00

## 2020-04-03 RX ADMIN — FENTANYL CITRATE 25 MCG: 50 INJECTION, SOLUTION INTRAMUSCULAR; INTRAVENOUS at 16:53

## 2020-04-03 RX ADMIN — PROPOFOL 20 MG: 10 INJECTION, EMULSION INTRAVENOUS at 16:58

## 2020-04-03 RX ADMIN — PROPOFOL 20 MG: 10 INJECTION, EMULSION INTRAVENOUS at 16:52

## 2020-04-03 ASSESSMENT — PULMONARY FUNCTION TESTS
PIF_VALUE: 0
PIF_VALUE: 1
PIF_VALUE: 0
PIF_VALUE: 1
PIF_VALUE: 0
PIF_VALUE: 1
PIF_VALUE: 0
PIF_VALUE: 1

## 2020-04-03 ASSESSMENT — PAIN DESCRIPTION - PAIN TYPE: TYPE: SURGICAL PAIN

## 2020-04-03 ASSESSMENT — PAIN DESCRIPTION - LOCATION: LOCATION: FOOT

## 2020-04-03 ASSESSMENT — ENCOUNTER SYMPTOMS: SHORTNESS OF BREATH: 0

## 2020-04-03 ASSESSMENT — PAIN DESCRIPTION - ORIENTATION: ORIENTATION: LEFT

## 2020-04-03 ASSESSMENT — PAIN SCALES - GENERAL: PAINLEVEL_OUTOF10: 1

## 2020-04-03 NOTE — ANESTHESIA PRE PROCEDURE
Department of Anesthesiology  Preprocedure Note       Name:  Sparkle Duncan   Age:  59 y.o.  :  1955                                          MRN:  [id number]         Date:  4/3/2020      Surgeon: Italo Ash):  Jarrod Calderon MD    Procedure: REMOVAL PROMINENT SCREW LEFT FOOT - ALLISON (Left Foot)    Medications prior to admission:   Prior to Admission medications    Medication Sig Start Date End Date Taking? Authorizing Provider   Cholecalciferol (VITAMIN D) 50 MCG ( UT) CAPS capsule Take by mouth   Yes Historical Provider, MD   Multiple Vitamins-Minerals (THERAPEUTIC MULTIVITAMIN-MINERALS) tablet Take 1 tablet by mouth daily   Yes Historical Provider, MD   ATORVASTATIN CALCIUM PO Take by mouth daily   Yes Historical Provider, MD   aspirin EC 81 MG EC tablet Take 1 tablet by mouth 2 times daily Please avoid missing doses. Take for DVT blood clot prophylaxis 2/27/19 4/3/20 Yes Carson Almodovar 91, APRN - CNP   losartan-hydrochlorothiazide (HYZAAR) 100-12.5 MG per tablet Take 1 tablet by mouth daily   Yes Historical Provider, MD   metoprolol tartrate (LOPRESSOR) 50 MG tablet TAKE 1 TABLET TWICE A DAY 17  Yes Historical Provider, MD   levothyroxine (SYNTHROID) 75 MCG tablet Take 112.5 mcg by mouth Daily  13  Yes Historical Provider, MD   diazePAM (VALIUM) 5 MG tablet Take one tablet by mouth 1 hour prior to surgery with a sip of water. Then may take 1 tablet after surgery by mouth 4/3/20 4/3/20  Jarrod Calderon MD   cephALEXin (KEFLEX) 500 MG capsule Take 1 capsule by mouth 4 times daily 20   Jarrod Calderon MD   cephALEXin (KEFLEX) 500 MG capsule 2 tablets by mouth 1 hour before and 1 hour after procedure 19   Terry Saleem MD   gabapentin (NEURONTIN) 300 MG capsule as needed.   2/8/19 3/10/19  Historical Provider, MD       Current medications:    Current Facility-Administered Medications   Medication Dose Route Frequency Provider Last Rate Last Dose    ceFAZolin (ANCEF) 2 g in

## 2020-04-03 NOTE — ANESTHESIA POSTPROCEDURE EVALUATION
Department of Anesthesiology  Postprocedure Note    Patient: Crystal Louise  MRN: [id number]  YOB: 1955  Date of evaluation: 4/3/2020  Time:  6:31 PM     Procedure Summary     Date:  04/03/20 Room / Location:  [address]    Anesthesia Start:  6556 Anesthesia Stop:  7886    Procedure:  REMOVAL PROMINENT SCREW LEFT FOOT - ALLISON (Left Foot) Diagnosis:  (T85.848A LEFT FOOT PROMINENT SCREW)    Surgeon:  Chaya Mclean MD Responsible Provider:  Iglesia Jefferson MD    Anesthesia Type:  MAC ASA Status:  3          Anesthesia Type: MAC    Brandyn Phase I: Brandyn Score: 10    Brandyn Phase II: Brandyn Score: 10    Last vitals: Reviewed and per EMR flowsheets.        Anesthesia Post Evaluation    Patient location during evaluation: PACU  Patient participation: complete - patient participated  Level of consciousness: awake and alert  Airway patency: patent  Nausea & Vomiting: no nausea and no vomiting  Complications: no  Cardiovascular status: hemodynamically stable  Respiratory status: acceptable  Hydration status: stable

## 2020-04-03 NOTE — BRIEF OP NOTE
Brief Postoperative Note      Patient: Eliecer Hernandez  YOB: 1955  MRN: [id number]    Date of Procedure: 4/3/2020    Pre-Op Diagnosis: T85.848A LEFT FOOT PROMINENT SCREW    Post-Op Diagnosis: Same       Procedure(s):  REMOVAL PROMINENT SCREW LEFT FOOT - ALLISON    Surgeon(s):  Paige Salvador MD    Assistant:  Surgical Assistant: Shola Johnson    Anesthesia: Monitor Anesthesia Care    Estimated Blood Loss (mL): Minimal    Complications: None    Specimens:   * No specimens in log *    Implants:  * No implants in log *      Drains: * No LDAs found *    Findings: Same    Electronically signed by Paige Salvador MD on 4/3/2020 at 5:09 PM

## 2020-04-03 NOTE — H&P
Cough      I reviewed the HPI, medications, allergies, reason for surgery, diagnosis and treatment plan and there has been no change. The patient was evaluated by me today. Physical exam findings for this update include:    Vitals:    04/03/20 1230   BP: 139/77   Pulse: 64   Resp: 18   Temp: 97.3 °F (36.3 °C)   SpO2: 96%     Airway is intact  Chest: chest clear, no wheezing, rales, normal symmetric air entry, no tachypnea, retractions or cyanosis  Heart: regular rate and rhythm ; heart sounds normal  Findings on exam of the body region where surgery is to be performed include:  Left foot painful deep implant/screw.     Electronically signed by Justa Lynch on 4/3/2020 at 4:25 PM

## 2020-04-04 NOTE — OP NOTE
[address]                     [facility], [address]                                OPERATIVE REPORT    PATIENT NAME: Bharti Marie                     :        1955  MED REC NO:   [id number]                          ROOM:  ACCOUNT NO:   [de-identified]                           ADMIT DATE: 2020  PROVIDER:     Claire Samaniego MD    DATE OF PROCEDURE:  2020    PRIMARY CARE PHYSICIAN:  June Hatfield MD    PREOPERATIVE DIAGNOSIS:  Left foot prominent deep implant/screw. POSTOPERATIVE DIAGNOSIS:  Left foot prominent deep implant/screw. OPERATION PERFORMED:  Removal of deep implant left foot screw. SURGEON:  Claire Samaniego MD    ANESTHESIA:  Local MAC. ESTIMATED BLOOD LOSS:  Minimal.    COMPLICATIONS:  None. INDICATIONS:  This is a 61-year-old white female who had previous left  foot surgery in 2016 and another revision in 2017 by Dr. Deidre Watson for  second tarsometatarsal arthrodesis. Subsequently, she developed  nonunion and one of the screws started backing out and she has a burning  sensation and skin tenting. All risks, benefits and alternatives were  discussed with the patient. We recommended urgent removal before the  skin breaks down. Given the patient's Body mass index is 35.66 kg/m². added significant challenge to the procedure. It required significant physical and mental effort. It required 50% more time for such procedure. DESCRIPTION OF PROCEDURE:  The patient's left foot was marked. The  patient received 2 gm Ancef IV preoperatively. The patient was then  brought to the operating room, underwent sedation under local MAC with 1%  lidocaine. The left lower extremity was then prepped and draped in  regular sterile routine fashion. A time-out was called confirming the  patient's name, site and procedure. An incision was made over the prominent screw head.   Careful dissection  was performed, carefully removing the deep soft tissue. We then exposed  the screw head and using the appropriate screwdriver, we then backed out  the screw. At this point, we irrigated the wound copiously with normal  saline. There was no active bleeding. We then closed the skin with a  4-0 Monocryl. Steri-Strips were then applied. Dressing was then  applied in the form of Xeroform, 4 x 4, sterile Kerlix and Ace wrap. The patient tolerated the procedure well, was taken to the recovery in  stable condition. POSTOPERATIVE PLAN:  The patient will be discharged home. She can be  weightbearing as tolerated. No heavy impact activities for at least two  to four weeks.         Lyly Demarco MD    D: 04/03/2020 17:26:47       T: 04/03/2020 23:13:35     [id number]  Job#: [id number]     Doc#: [id number]    CC:  Addison Haley

## 2020-06-17 ENCOUNTER — OFFICE VISIT (OUTPATIENT)
Dept: ORTHOPEDIC SURGERY | Age: 65
End: 2020-06-17
Payer: COMMERCIAL

## 2020-06-17 VITALS — BODY MASS INDEX: 31.39 KG/M2 | TEMPERATURE: 97.3 F | HEIGHT: 67 IN | WEIGHT: 200 LBS | RESPIRATION RATE: 16 BRPM

## 2020-06-17 PROCEDURE — 99024 POSTOP FOLLOW-UP VISIT: CPT | Performed by: ORTHOPAEDIC SURGERY

## 2020-06-17 PROCEDURE — 99214 OFFICE O/P EST MOD 30 MIN: CPT | Performed by: ORTHOPAEDIC SURGERY

## 2020-06-17 PROCEDURE — 1036F TOBACCO NON-USER: CPT | Performed by: ORTHOPAEDIC SURGERY

## 2020-06-17 PROCEDURE — G8417 CALC BMI ABV UP PARAM F/U: HCPCS | Performed by: ORTHOPAEDIC SURGERY

## 2020-06-17 PROCEDURE — G8427 DOCREV CUR MEDS BY ELIG CLIN: HCPCS | Performed by: ORTHOPAEDIC SURGERY

## 2020-06-17 PROCEDURE — 3017F COLORECTAL CA SCREEN DOC REV: CPT | Performed by: ORTHOPAEDIC SURGERY

## 2020-06-17 NOTE — PROGRESS NOTES
Socioeconomic History    Marital status:      Spouse name: Not on file    Number of children: Not on file    Years of education: Not on file    Highest education level: Not on file   Occupational History    Occupation: sales   Social Needs    Financial resource strain: Not on file    Food insecurity     Worry: Not on file     Inability: Not on file   [employer] needs     Medical: Not on file     Non-medical: Not on file   Tobacco Use    Smoking status: Never Smoker    Smokeless tobacco: Never Used   Substance and Sexual Activity    Alcohol use: Yes     Comment: occasionally    Drug use: No    Sexual activity: Not on file   Lifestyle    Physical activity     Days per week: Not on file     Minutes per session: Not on file    Stress: Not on file   Relationships    Social connections     Talks on phone: Not on file     Gets together: Not on file     Attends Gnosticist service: Not on file     Active member of club or organization: Not on file     Attends meetings of clubs or organizations: Not on file     Relationship status: Not on file    Intimate partner violence     Fear of current or ex partner: Not on file     Emotionally abused: Not on file     Physically abused: Not on file     Forced sexual activity: Not on file   Other Topics Concern    Not on file   Social History Narrative    Not on file       History reviewed. No pertinent family history.     Current Outpatient Medications on File Prior to Visit   Medication Sig Dispense Refill    Cholecalciferol (VITAMIN D) 50 MCG (2000 UT) CAPS capsule Take by mouth      Multiple Vitamins-Minerals (THERAPEUTIC MULTIVITAMIN-MINERALS) tablet Take 1 tablet by mouth daily      cephALEXin (KEFLEX) 500 MG capsule Take 1 capsule by mouth 4 times daily 20 capsule 0    ATORVASTATIN CALCIUM PO Take by mouth daily      cephALEXin (KEFLEX) 500 MG capsule 2 tablets by mouth 1 hour before and 1 hour after procedure 8 capsule 1    aspirin EC 81 MG

## 2020-06-23 ENCOUNTER — TELEPHONE (OUTPATIENT)
Dept: ORTHOPEDIC SURGERY | Age: 65
End: 2020-06-23

## 2020-06-23 NOTE — TELEPHONE ENCOUNTER
Called and spoke to Flavia November, she stated she has developed a low grade fever starting last night, scratchy sore throat and took some sinus medication but would like to cancel TMT injections tomorrow. Informed her that I would let our schedulers know tomorrow for everyone is gone for the day and to call patient to reschedule at a later date.  Patient in agreement to plan     Viktoria Ocampo can you please cancel injection at noon

## 2020-07-08 ENCOUNTER — TELEPHONE (OUTPATIENT)
Dept: ORTHOPEDIC SURGERY | Age: 65
End: 2020-07-08

## 2020-07-22 ENCOUNTER — OFFICE VISIT (OUTPATIENT)
Dept: ORTHOPEDIC SURGERY | Age: 65
End: 2020-07-22
Payer: COMMERCIAL

## 2020-07-22 ENCOUNTER — HOSPITAL ENCOUNTER (OUTPATIENT)
Dept: GENERAL RADIOLOGY | Age: 65
Discharge: HOME | End: 2020-07-22
Payer: COMMERCIAL

## 2020-07-22 PROBLEM — M19.079 ARTHRITIS OF MIDFOOT: Status: ACTIVE | Noted: 2020-07-22

## 2020-07-22 PROCEDURE — 20605 DRAIN/INJ JOINT/BURSA W/O US: CPT | Performed by: ORTHOPAEDIC SURGERY

## 2020-07-22 PROCEDURE — 2500000003 HC RX 250 WO HCPCS

## 2020-07-22 PROCEDURE — 20610 DRAIN/INJ JOINT/BURSA W/O US: CPT

## 2020-07-22 PROCEDURE — 6360000002 HC RX W HCPCS

## 2020-07-22 PROCEDURE — 77002 NEEDLE LOCALIZATION BY XRAY: CPT | Performed by: ORTHOPAEDIC SURGERY

## 2020-07-22 NOTE — PROGRESS NOTES
PROCEDURE:         7/22/2020   With the patient's permission, and under sterile condition, and using [facility] at the Hospital, the right foot was prepared and draped with alcohol and  The right 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 joints were injected with a mixture of 1 ml Kenalog 40mg and 2 ml of 1% lidocaine. The patient tolerated the procedure well. A band-aid was applied and the patient was advised to ice the ankle for 15-20 minutes to relieve any injection site related pain. she reported a good improvement immediatly after the injection. Diagnosis Orders   1.  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

## 2020-07-23 ENCOUNTER — HOSPITAL ENCOUNTER (OUTPATIENT)
Dept: GENERAL RADIOLOGY | Age: 65
Discharge: HOME | End: 2020-07-23
Payer: COMMERCIAL

## 2020-07-23 PROCEDURE — 77002 NEEDLE LOCALIZATION BY XRAY: CPT

## 2020-10-16 ENCOUNTER — OFFICE VISIT (OUTPATIENT)
Dept: ORTHOPEDIC SURGERY | Age: 65
End: 2020-10-16
Payer: COMMERCIAL

## 2020-10-16 VITALS — HEIGHT: 67 IN | TEMPERATURE: 98 F | WEIGHT: 200 LBS | BODY MASS INDEX: 31.39 KG/M2

## 2020-10-16 PROCEDURE — 99214 OFFICE O/P EST MOD 30 MIN: CPT | Performed by: ORTHOPAEDIC SURGERY

## 2020-10-16 PROCEDURE — 1090F PRES/ABSN URINE INCON ASSESS: CPT | Performed by: ORTHOPAEDIC SURGERY

## 2020-10-16 PROCEDURE — 4040F PNEUMOC VAC/ADMIN/RCVD: CPT | Performed by: ORTHOPAEDIC SURGERY

## 2020-10-16 PROCEDURE — G8417 CALC BMI ABV UP PARAM F/U: HCPCS | Performed by: ORTHOPAEDIC SURGERY

## 2020-10-16 PROCEDURE — 1036F TOBACCO NON-USER: CPT | Performed by: ORTHOPAEDIC SURGERY

## 2020-10-16 PROCEDURE — 1123F ACP DISCUSS/DSCN MKR DOCD: CPT | Performed by: ORTHOPAEDIC SURGERY

## 2020-10-16 PROCEDURE — 3017F COLORECTAL CA SCREEN DOC REV: CPT | Performed by: ORTHOPAEDIC SURGERY

## 2020-10-16 PROCEDURE — G8400 PT W/DXA NO RESULTS DOC: HCPCS | Performed by: ORTHOPAEDIC SURGERY

## 2020-10-16 PROCEDURE — G8427 DOCREV CUR MEDS BY ELIG CLIN: HCPCS | Performed by: ORTHOPAEDIC SURGERY

## 2020-10-16 PROCEDURE — G8484 FLU IMMUNIZE NO ADMIN: HCPCS | Performed by: ORTHOPAEDIC SURGERY

## 2020-10-16 NOTE — PROGRESS NOTES
DIAGNOSIS:    1-Left foot prominent deep screw hardware removal.  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 DATE OF SURGERY: 4/3/2020. HISTORY OF PRESENT ILLNESS:  Ms. Isabelle Maya 72 y.o.  female who came in today for 3 months postoperative visit. The patient denies any pain in the left foot and rates a 0/10 VAS. She has been WBAT and doing much better. No numbness or tingling sensation. No fever or Chills. She is also here for worsening pain in her right midfoot. She had a cortisone injection in her right midfoot second and third TMT joint on 2020 under fluoroscopy and had good relief until a couple weeks ago. She was initially seen as a 2nd opinion for evaluation of bilateral midfoot pain which started 8 years ago.  She is complaining of sharp right midfoot pain. Pain is increase with standing and walking and shoe wear. Pain is sharp with first few steps, dull achy pain by the end of the day. Alleviating factors: elevation and rest.  She is wanting to repeat a cortisone injection in her right foot. No radiation and no numbness and tingling sensation. No other complaint. She had left foot 2nd TMT fusion in 2016 and then revision fusion with calcaneus bone graft in 2017 by Dr Rosa Tompkins. She saw him in March and told her about the prominent screw and that she might need removal, and also right midfoot fusion. She then saw Dr Shirley Alaniz at [facility] and had CT scan left foot 3/31/2020.      Past Medical History:   Diagnosis Date    CAD (coronary artery disease)     not following with cardiology    Chronic insomnia     Hypertension     Hypothyroidism     PEDRITO (obstructive sleep apnea)     does not use cpap    Plantar fasciitis        Past Surgical History:   Procedure Laterality Date     SECTION      COLONOSCOPY      CORONARY ANGIOPLASTY      FOOT SURGERY      left    FOOT SURGERY Left 4/3/2020    REMOVAL PROMINENT SCREW LEFT FOOT - ALLISON performed by Merlinda Goring, MD at [facility] MG per tablet Take 1 tablet by mouth daily      metoprolol tartrate (LOPRESSOR) 50 MG tablet TAKE 1 TABLET TWICE A DAY      levothyroxine (SYNTHROID) 75 MCG tablet Take 112.5 mcg by mouth Daily       aspirin EC 81 MG EC tablet Take 1 tablet by mouth 2 times daily Please avoid missing doses. Take for DVT blood clot prophylaxis 60 tablet 0    gabapentin (NEURONTIN) 300 MG capsule as needed. No current facility-administered medications on file prior to visit. Pertinent items are noted in HPI  Review of systems reviewed from Patient History Form dated on 4/1/2020 and available in the patient's chart under the Media tab. No change noted. PHYSICAL EXAMINATION:  Ms. Dominga Singer is a very pleasant 72 y.o.  female who presents today in no acute distress, awake, alert, and oriented. She is well dressed, nourished and  groomed. Patient with normal affect. Height is  5' 7\" (1.702 m), weight is 200 lb (90.7 kg), Body mass index is 31.32 kg/m². Resting respiratory rate is 16. The incision healing well left foot. No signs of any erythema or drainage, no swelling. She has no pain with the active or passive range of motion of the left foot, good ROM. She has intact sensation distally, and she is neurovascularly intact. Examination of the gait, showed that the patient walks heel-toe with minimal limp.  Examination of both ankles showing dorsiflexion to about 10 degrees bilaterally, which increased with knee flexion. She has intact sensation and good pedal pulses.  She has good strength in all four planes, including eversion, and has tenderness on deep palpation over the right midfoot and osteophytes right foot compared to the other side.  The ankles are stable to drawer test bilaterally, equally.        IMAGING:  Three views left foot taken today in the office showed hardware removal, no fracture. The remaining hardware is intact with no signs of failure.     Xray, 3 views of the right foot

## 2020-11-30 ENCOUNTER — TELEPHONE (OUTPATIENT)
Dept: ORTHOPEDIC SURGERY | Age: 65
End: 2020-11-30

## 2020-11-30 NOTE — TELEPHONE ENCOUNTER
Other Patient is calling to schedule a tissue injection that dr Hinkle Sayty typically does on wednesdays. Would like a call back to schedule.  ph [phone]

## 2020-11-30 NOTE — LETTER
[address] [place] [address] Ortho & Spine  Surgery Scheduling Form:      DEMOGRAPHICS:                                                                                                              .    Patient Name:  Rosalia Lawrence  Patient :  1955   Patient SS#:      Patient Phone:  [phone] (home) [phone] (work) Alt. Patient Phone:    Patient Address:  [address] [address]    PCP:  Emmanuel Swartz Belier:    Payor/Plan Subscr  Sex Relation Sub. Ins. ID Effective Group Num   1.  [employer] [facility] 1955 Female  [id number]                                   [address]   Insurance ID Number:  DIAGNOSIS & PROCEDURE:                                                                                            .    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    Surgeon's Scheduling Instruction:  2020  Requested Date:   2020 OR Time:  1200 Patient Arrival Time:  1130  OR Time Required:  15  Minutes  Anesthesia:  General    SA Required:  yes  Equipment:  n/a  Mini C-Arm:  No    Standard C-Arm:  No  Status:  Outpatient    PAT Required:  Yes  Latex Allergy:  unknown    Defibrilator or Pacemaker:  unknown  Isolation Precautions:  unknown  Comments:                       Ji Pa MD 20   BILLING INFORMATION:                                                                                                    .    Procedure:       CPT Code Modifier                  Pre-Certification:

## 2020-12-01 ENCOUNTER — TELEPHONE (OUTPATIENT)
Dept: ORTHOPEDIC SURGERY | Age: 65
End: 2020-12-01

## 2020-12-09 ENCOUNTER — HOSPITAL ENCOUNTER (OUTPATIENT)
Dept: GENERAL RADIOLOGY | Age: 65
Discharge: HOME | End: 2020-12-09
Payer: COMMERCIAL

## 2020-12-09 ENCOUNTER — OFFICE VISIT (OUTPATIENT)
Dept: ORTHOPEDIC SURGERY | Age: 65
End: 2020-12-09

## 2020-12-09 PROCEDURE — 77002 NEEDLE LOCALIZATION BY XRAY: CPT | Performed by: ORTHOPAEDIC SURGERY

## 2020-12-09 PROCEDURE — 2500000003 HC RX 250 WO HCPCS

## 2020-12-09 PROCEDURE — 20600 DRAIN/INJ JOINT/BURSA W/O US: CPT | Performed by: ORTHOPAEDIC SURGERY

## 2020-12-09 PROCEDURE — 6360000002 HC RX W HCPCS

## 2020-12-09 PROCEDURE — 77002 NEEDLE LOCALIZATION BY XRAY: CPT

## 2020-12-09 PROCEDURE — 20600 DRAIN/INJ JOINT/BURSA W/O US: CPT

## 2020-12-10 NOTE — PROGRESS NOTES
PROCEDURE:         12/9/2020   With the patient's permission, and under sterile condition, and using Fluoroscopy at the Hospital, the right foot was prepared and draped with alcohol and  The right 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 joints were injected with a mixture of 1 ml Kenalog 40mg and 2 ml of 1% lidocaine. The patient tolerated the procedure well. A band-aid was applied and the patient was advised to ice the ankle for 15-20 minutes to relieve any injection site related pain. she reported a good improvement immediatly after the injection. Diagnosis Orders   1.  Arthritis of right midfoot, 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            Aguilar Larry MD

## 2020-12-21 ENCOUNTER — OFFICE VISIT (OUTPATIENT)
Dept: ORTHOPEDIC SURGERY | Age: 65
End: 2020-12-21
Payer: COMMERCIAL

## 2020-12-21 VITALS — BODY MASS INDEX: 31.39 KG/M2 | TEMPERATURE: 97.3 F | WEIGHT: 200 LBS | HEIGHT: 67 IN | RESPIRATION RATE: 16 BRPM

## 2020-12-21 PROCEDURE — G8427 DOCREV CUR MEDS BY ELIG CLIN: HCPCS | Performed by: ORTHOPAEDIC SURGERY

## 2020-12-21 PROCEDURE — G8484 FLU IMMUNIZE NO ADMIN: HCPCS | Performed by: ORTHOPAEDIC SURGERY

## 2020-12-21 PROCEDURE — 99213 OFFICE O/P EST LOW 20 MIN: CPT | Performed by: ORTHOPAEDIC SURGERY

## 2020-12-21 PROCEDURE — G8417 CALC BMI ABV UP PARAM F/U: HCPCS | Performed by: ORTHOPAEDIC SURGERY

## 2020-12-21 PROCEDURE — 1090F PRES/ABSN URINE INCON ASSESS: CPT | Performed by: ORTHOPAEDIC SURGERY

## 2020-12-21 NOTE — PATIENT INSTRUCTIONS
Pre-Operative Instructions    1. The night before your surgery, unless otherwise instructed, do not eat any food, drink any liquids, chew gum or mints after midnight. Abstain from alcohol for 24 hours prior to surgery. 2. You will be contacted by the Hospital the working day prior to your procedure to confirm your arrival time. 3. Patients under 25years of age must have a parent or legal guardian present to sign their consent and discharge paperwork. 4. On the day of surgery,  you will be seen pre-operatively by an anesthesiologist.     5. If you are having hand surgery, it is recommended that nail polish and acrylic nails be removed prior to surgery if possible. 6. Please bring cases for glasses, contact lenses, hearing aids or dentures. They will likely be removed prior to surgery. 7. Wear casual, loose-fitting and comfortable clothing. Consider that you may have a large dressing to fit under your clothing after surgery. 9. Please do not bring valuables such as jewelry or large sums of cash to the hospital. Remove all body piercings before coming to the hospital. Lizzy Silvaabbejailyn may not  wear any rings on the hand if you are having surgery on that hand, wrist or elbow. 10. Do not smoke or chew tobacco before your surgery. [address] [place] [address] and surgery facilities are smoke-free environments. Smoking is not permitted anywhere on campus. 11. Be sure to follow any additional instructions from your physician. If the above conditions are not met, your surgery may be cancelled and rescheduled for another day. Should you develop any change in your health such as fever, cough, sore throat, cold, flu, or infection, or if you have any questions regarding your Pre-admission or surgery, please contact [address] - Surgery Scheduling at [phone], Monday through Friday, 9 a.m. to 5 p.m.

## 2020-12-21 NOTE — LETTER
[address] [facility] - HAND SURGERY  Surgery Scheduling Form:      DEMOGRAPHICS:                                                                                                              .    Patient Name:  Gabi Pryor  Patient :  1955   Patient SS#:  [id number]    Patient Phone:  [phone] (home) [phone] (work)   Patient Address:  [address] [place] [address]    PCP:  Emmanuel Swartz Belier:   Payor/Plan Subscr  Sex Relation Sub. Ins. ID Effective Group Num   1.  [employer],[address] 1955 Female Self [id number]                                   [address]     DIAGNOSIS & PROCEDURE:                                                                                            .  Diagnosis:   left Index Finger Digital Mucous Cyst  (727.41)   M67.449  Operation:  left Index Finger Digital Mucous Cyst  Excision with Distal interphalangeal Joint Debridement  [CPT: 23718 + 41906]  Location:  [facility] ORTHOPEDIC AND SPINE [facility]  Surgeon:  Ramona Velasquez    SCHEDULING INFORMATION:                                                                                         .    Surgeon's Scheduling Instruction:  Elective - PT REQUEST BEFORE 21  RN Post-op Appt:  [x] Yes   [] No  Preferred Thursday:   [] Yes   [] No    Requested :    OR Time:  11:15 Patient Arrival Time:  9:45    OR Time Required:  15  Minutes  Anesthesia:  MAC/TIVA  Equipment:  None                                 COVID    Mini C-Arm:  Yes   Standard C-Arm:  No  Status:  outpatient  PAT Required:  Yes  Comments:                      Aspen Malloy. Elyssa Coy MD  20 1:37 PM    BILLING INFORMATION:                                                                                                    .    Procedure:       CPT Code Modifier  left Index Finger Digital Mucous Cyst  Excision with Distal Interphalangeal  Joint Debridement      .  [address]            Pre Operative Physician Prophylaxis Orders - SCIP Protocols      Pre-Operative Antibiotic Order:    Allergies   Allergen Reactions    Lisinopril      Cough          [x]  ----  No Antibiotic Ordered       []  ----  Give the following Antibiotic within 1 hour prior to start time:         Ancef 1 gram IV if patient is less than 200 pounds    or       Ancef 2 grams IV if patient is greater than 200 pounds    or      Vancomycin 1 gram IV (over 1 hour) if patient is allergic to           PENICILLINS or CEFALOSPORINS            SURG                         COVID  12-28                    H&P      PCP__XX__  20    Procedure:  left Index Finger Digital Mucous Cyst  Excision with Distal Interphalangeal  Joint Debridement      Patient: King Knowles  :    1955    Physician Signature:     Date: 20  Time: 1:37 PM

## 2020-12-21 NOTE — LETTER
? Anesthetic and/or Medical Risks  ? We have discussed the specific limitations and risks of hospital and/or office based treatment at this time due to the COVID-19 pandemic                I have been counseled about the risks of shaq Covid-19 in the anila-operative and post-operative periods related to this procedure. I have been made aware that shaq Covid-19 around the time of a surgical procedure may worsen my prognosis for recovering from the virus and lend to a higher morbidity and or mortality risk. With this knowledge, I have requested to proceed with the procedure as scheduled. 4. I have also been informed by the informing physician that there are other risks from both known and unknown causes that are attendant to the performance of any surgical procedure. I am aware that the practice of medicine and surgery is not an exact science, and that no guarantees have been made to me concerning the results of the operation and/or procedure(s). 5. I   CONSENT / REFUSE CONSENT  (strike the phrase that does not apply) to the taking of photographs before, during and/or after the operation or procedure for scientific/educational purposes. 6. I consent to the administration of anesthesia and to the use of such anesthetics as may be deemed advisable by the anesthesiologist who has been engaged by me or my physician. 7. I certify that I have read and understand the above consent to operation and/or other procedure(s); that the explanations therein referred to were made to me by the informing physician in advance of my signing this consent; that all blanks or statements requiring insertion or completion were filled in and inapplicable paragraphs, if any, were stricken before I signed; and that all questions asked by me about the operation and/or procedure(s) which I have consented to have been fully answered in a satisfactory manner. _______________________           12/21/20                              Witness     Signature Of Patient         Date        Joel Bourne                                                 Informing Physician                                           Signature of Informing Physician                              If patient is unable to sign or is a minor, complete one of the following:    (A)  Patient is a minor   years of age. (B)  Patient is unable to sign because: The undersigned represents that he or she is duly authorized to execute this consent for and on behalf of the above named patient.                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 Itzel Edouard will not state that any patient is off of work or cannot work at all. He will place you on restrictions after your surgical procedure or injury. This means that you will be allowed to return to work the day after your office visit or surgery with restrictions. Depending on the details of your particular situation, Dr. Itzel Edouard may state that you will have either light use or no use of your hand for a specific number of weeks. It is your obligation to communicate with your employer regarding your restrictions. It is your employer's decision as to whether they will accommodate your restrictions (i.e. allow you to come to work in your restricted capacity) or to not allow you to return to work under your restrictions. Dr. Itzel Edouard does not participate in making this decision and cannot influence your employer regarding their decision. If you do not communicate your restrictions to your employer, or if you do not present to work as you are scheduled to, Dr. Itzel Edouard will not provide an 'excuse' to explain your absence. A doctors note, or official forms (BWC, FMLA, etc.) will be filled out, upon request, to indicate your date of surgery and your restrictions as stated above. Dr. Itzel Edouard' Narcotic Policy  Patients will only be prescribed narcotics after surgical procedures or significant injury. Not all procedures cause pain great enough to require Narcotics and thus, not all patients will receive prescriptions after surgical procedures or injuries. Narcotics are never prescribed for chronic conditions. Narcotics are never prescribed for use longer than one week at a time. Refills are only granted in unusual circumstances and only at Dr. Jess Rocha discretion. Patients who are receiving narcotic medication from another physician or who are under pain management contracts will not be given a prescription for narcotics for any reason. I have read the above policies and understand that by agreeing to proceed with treatment by Dr. Misti Gracia and his team, that I am agreeing to abide by these policies.   Patient Name:  Dilcia Azar    Patient Signature:  _____________________________    Sue Schwartz Date:   12/21/20

## 2020-12-21 NOTE — PROGRESS NOTES
Ms. Blayne Bland is a 72 y.o. left handed woman  who is seen today in Hand Surgical Consultation at the request of Debbie Atkins. She presents today regarding a left Index Finger mass which have been present for approximately 3 months. A history of antecedent trauma or injury is Absent. She reports a prominent mass on the Dorsal aspect of the Index Finger with symptoms that include Pain and Swelling. Finger symptoms are exacerbated with Gripping and use. The size of the mass has been fluctuating with time and change in activity level. She reports that the mass has ever drained in the past;  There has not been any previous sign of infection in the region of the mass. Previous treatment has included no prior treatments used. She does not claim relation of her symptoms to her required work activities. She has not undergone any form of testing. I have today reviewed with Blayne Bland the clinically relevant, past medical history, medications, allergies,  family history, social history, and Review Of Systems & I have documented any details relevant to today's presenting complaints in my history above. Ms. Silvano Matos's self-reported past medical history, medications, allergies,  family history, social history, and Review Of Systems have been scanned into the chart under the \"Media\" tab. Physical Exam:  Ms. Silvano Matos's most recent vitals:  Vitals  Temp: 97.3 °F (36.3 °C)  Resp: 16  Height: 5' 7\" (170.2 cm)  Weight: 200 lb (90.7 kg)    She is well nourished, oriented to person, place & time. She demonstrates appropriate mood and affect as well as normal gait and station. Skin: Normal in appearance, Normal Color and Free of Lesions Bilaterally. There is not erythema, warmth or other sign of infection. 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 Wrist range of motion is without significant limitation bilaterally. There is no evidence of gross joint instability bilaterally. Sensation is normal in the Index Finger otherwise normal bilaterally  Muscular strength is clinically appropriate bilaterally. Vascular examination reveals normal and good capillary refill bilaterally  There is no Swelling bilaterally  There are Heberden's Nodes developing along the dorsum of the DIP joints of the fingers diffusely. There is a 6 mm Firm mass on the Dorsal aspect of the Index Finger nearest to the DIP Joint. The mass is mildly tender to palpation, worse in maximal digital flexion/extension. The mass does have a \"clear\" appearance to it's contents and does not appear to transilluminate. There is not a longitudinal nail plate deformity, running along the length of the nail, associated with the mass. Radiographic Evaluation:  Radiographs were obtained today (2 views of the left Index Finger). They demonstrate no evidence of acute fracture, subluxation, or dislocation. There is no joint malalignment, moderate degenerative change at the small joints of the fingers diffusely. There is a dorsal osteophyte from the DIP joint at the site of the clinical mass. Impression:  Ms. Anabelle Rodgers is showing clinical evidence of a Dorsal Index Finger mass clinically consistent with a Digital Mucous Cyst, and presents requesting further treatment.     Plan: I have had a thorough discussion with Ms. Kiera Osorio regarding the treatment options available for her initially presenting left Index Finger Digital Mucous Cyst, which is causing her significant  limitations. I have outlined for Ms. Kiera Osorio the benefits and consequences of the various treatment modalities, including the fact that surgical treatment is the only modality which is reasonably expected to provide long lasting or permanent resolution of her symptoms. Based upon our current discussion and a reasonable understating of the options available to her, Ms. Kiera Osorio has selected to proceed with surgical Index Finger Digital Mucous Cyst excision with arthrotomy and DIP Joint debridement as needed. I have discussed the details of the surgical procedure, the pre, anila and postoperative concerns and the appropriate expectations after surgery with Ms. Kiera Osorio today. She was given the opportunity to ask questions, voiced an understanding of the procedure, and she did wish to proceed with Left Index Finger Digital Mucous Cyst excision with arthrotomy and DIP Joint debridement as needed.

## 2020-12-21 NOTE — Clinical Note
Dear  Hayden Cross,    Thank you very much for your referral or Ms. Cara Chavez to me for evaluation and treatment of her Hand & Wrist condition. I appreciate your confidence in me and thank you for allowing me the opportunity to care for your patients. If I can be of any further assistance to you on this or any other patient, please do not hesitate to contact me. Sincerely,    Vasquez Okeefe.  Harris Eldridge MD

## 2020-12-22 ENCOUNTER — TELEPHONE (OUTPATIENT)
Dept: ORTHOPEDIC SURGERY | Age: 65
End: 2020-12-22

## 2020-12-28 ENCOUNTER — OFFICE VISIT (OUTPATIENT)
Dept: PRIMARY CARE CLINIC | Age: 65
End: 2020-12-28
Payer: COMMERCIAL

## 2020-12-28 PROCEDURE — G8428 CUR MEDS NOT DOCUMENT: HCPCS | Performed by: NURSE PRACTITIONER

## 2020-12-28 PROCEDURE — G8417 CALC BMI ABV UP PARAM F/U: HCPCS | Performed by: NURSE PRACTITIONER

## 2020-12-28 PROCEDURE — 99211 OFF/OP EST MAY X REQ PHY/QHP: CPT | Performed by: NURSE PRACTITIONER

## 2020-12-28 NOTE — PROGRESS NOTES
Patient presented to [facility] drive up clinic for preop testing. Patient was swabbed and given information advising them to remain isolated until procedure date.

## 2020-12-29 LAB — SARS-COV-2: NOT DETECTED

## 2020-12-29 RX ORDER — ZOLPIDEM TARTRATE 10 MG/1
TABLET ORAL NIGHTLY PRN
COMMUNITY

## 2020-12-29 NOTE — PROGRESS NOTES

## 2020-12-29 NOTE — PROGRESS NOTES
C-Difficile admission screening and protocol:     * Admitted with diarrhea? YES____    NO__X___     *Prior history of C-Diff. In last 3 months? YES____   NO___X__     *Antibiotic use in the past 6-8 weeks? NO__X____YES______                 If yes which  ANTIBIOTIC AND REASON______     *Prior hospitalization or nursing home in the last month?  YES____   NO__X_

## 2020-12-29 NOTE — PROGRESS NOTES
[address] [facility] [place] time____0930________        Surgery time_____1105_______    Take the following medications with a sip of water: Follow your MD/Surgeons pre-procedure instructions regarding your medications    Do not eat or drink anything after 12:00 midnight prior to your surgery. This includes water chewing gum, mints and ice chips. You may brush your teeth and gargle the morning of your surgery, but do not swallow the water     Please see your family doctor/pediatrician for a history and physical and/or concerning medications. Bring any test results/reports from your physicians office. If you are under the care of a heart doctor or specialist doctor, please be aware that you may be asked to them for clearance    You may be asked to stop blood thinners such as Coumadin, Plavix, Fragmin, Lovenox, etc., or any anti-inflammatories such as:  Aspirin, Ibuprofen, Advil, Naproxen prior to your surgery. We also ask that you stop any OTC medications such as fish oil, vitamin E, glucosamine, garlic, Multivitamins, COQ 10, etc.    We ask that you do not smoke 24 hours prior to surgery  We ask that you do not  drink any alcoholic beverages 24 hours prior to surgery     You must make arrangements for a responsible adult to take you home after your surgery. For your safety you will not be allowed to leave alone or drive yourself home. Your surgery will be cancelled if you do not have a ride home. Also for your safety, it is strongly suggested that someone stay with you the first 24 hours after your surgery. A parent or legal guardian must accompany a child scheduled for surgery and plan to stay at the hospital until the child is discharged. Please do not bring other children with you. For your comfort, please wear simple loose fitting clothing to the hospital.  Please do not bring valuables.     Do not wear any make-up or nail polish on your fingers or

## 2020-12-30 ENCOUNTER — ANESTHESIA EVENT (OUTPATIENT)
Dept: OPERATING ROOM | Age: 65
End: 2020-12-30
Payer: COMMERCIAL

## 2020-12-31 ENCOUNTER — HOSPITAL ENCOUNTER (OUTPATIENT)
Age: 65
Setting detail: OUTPATIENT SURGERY
Discharge: HOME | End: 2020-12-31
Attending: ORTHOPAEDIC SURGERY | Admitting: ORTHOPAEDIC SURGERY
Payer: COMMERCIAL

## 2020-12-31 ENCOUNTER — ANESTHESIA (OUTPATIENT)
Dept: OPERATING ROOM | Age: 65
End: 2020-12-31
Payer: COMMERCIAL

## 2020-12-31 VITALS
DIASTOLIC BLOOD PRESSURE: 57 MMHG | RESPIRATION RATE: 12 BRPM | OXYGEN SATURATION: 95 % | SYSTOLIC BLOOD PRESSURE: 102 MMHG

## 2020-12-31 VITALS
WEIGHT: 200 LBS | SYSTOLIC BLOOD PRESSURE: 126 MMHG | TEMPERATURE: 96.9 F | OXYGEN SATURATION: 95 % | RESPIRATION RATE: 18 BRPM | HEIGHT: 67 IN | DIASTOLIC BLOOD PRESSURE: 80 MMHG | BODY MASS INDEX: 31.39 KG/M2 | HEART RATE: 57 BPM

## 2020-12-31 DIAGNOSIS — M67.449 DIGITAL MUCOUS CYST OF FINGER: ICD-10-CM

## 2020-12-31 PROCEDURE — 7100000001 HC PACU RECOVERY - ADDTL 15 MIN: Performed by: ORTHOPAEDIC SURGERY

## 2020-12-31 PROCEDURE — 6360000002 HC RX W HCPCS: Performed by: NURSE ANESTHETIST, CERTIFIED REGISTERED

## 2020-12-31 PROCEDURE — 3600000005 HC SURGERY LEVEL 5 BASE: Performed by: ORTHOPAEDIC SURGERY

## 2020-12-31 PROCEDURE — 3700000000 HC ANESTHESIA ATTENDED CARE: Performed by: ORTHOPAEDIC SURGERY

## 2020-12-31 PROCEDURE — 2709999900 HC NON-CHARGEABLE SUPPLY: Performed by: ORTHOPAEDIC SURGERY

## 2020-12-31 PROCEDURE — 3700000001 HC ADD 15 MINUTES (ANESTHESIA): Performed by: ORTHOPAEDIC SURGERY

## 2020-12-31 PROCEDURE — 3600000015 HC SURGERY LEVEL 5 ADDTL 15MIN: Performed by: ORTHOPAEDIC SURGERY

## 2020-12-31 PROCEDURE — 88304 TISSUE EXAM BY PATHOLOGIST: CPT

## 2020-12-31 PROCEDURE — 2500000003 HC RX 250 WO HCPCS: Performed by: NURSE ANESTHETIST, CERTIFIED REGISTERED

## 2020-12-31 PROCEDURE — 2580000003 HC RX 258: Performed by: ORTHOPAEDIC SURGERY

## 2020-12-31 PROCEDURE — 7100000000 HC PACU RECOVERY - FIRST 15 MIN: Performed by: ORTHOPAEDIC SURGERY

## 2020-12-31 PROCEDURE — 7100000011 HC PHASE II RECOVERY - ADDTL 15 MIN: Performed by: ORTHOPAEDIC SURGERY

## 2020-12-31 PROCEDURE — 2580000003 HC RX 258: Performed by: ANESTHESIOLOGY

## 2020-12-31 PROCEDURE — 2500000003 HC RX 250 WO HCPCS: Performed by: ORTHOPAEDIC SURGERY

## 2020-12-31 PROCEDURE — 7100000010 HC PHASE II RECOVERY - FIRST 15 MIN: Performed by: ORTHOPAEDIC SURGERY

## 2020-12-31 RX ORDER — LIDOCAINE HYDROCHLORIDE 20 MG/ML
INJECTION, SOLUTION EPIDURAL; INFILTRATION; INTRACAUDAL; PERINEURAL PRN
Status: DISCONTINUED | OUTPATIENT
Start: 2020-12-31 | End: 2020-12-31 | Stop reason: SDUPTHER

## 2020-12-31 RX ORDER — HYDROCODONE BITARTRATE AND ACETAMINOPHEN 5; 325 MG/1; MG/1
2 TABLET ORAL PRN
Status: DISCONTINUED | OUTPATIENT
Start: 2020-12-31 | End: 2020-12-31 | Stop reason: HOSPADM

## 2020-12-31 RX ORDER — ONDANSETRON 2 MG/ML
4 INJECTION INTRAMUSCULAR; INTRAVENOUS
Status: DISCONTINUED | OUTPATIENT
Start: 2020-12-31 | End: 2020-12-31 | Stop reason: HOSPADM

## 2020-12-31 RX ORDER — SODIUM CHLORIDE 0.9 % (FLUSH) 0.9 %
10 SYRINGE (ML) INJECTION EVERY 12 HOURS SCHEDULED
Status: DISCONTINUED | OUTPATIENT
Start: 2020-12-31 | End: 2020-12-31 | Stop reason: HOSPADM

## 2020-12-31 RX ORDER — PROMETHAZINE HYDROCHLORIDE 25 MG/ML
6.25 INJECTION, SOLUTION INTRAMUSCULAR; INTRAVENOUS
Status: DISCONTINUED | OUTPATIENT
Start: 2020-12-31 | End: 2020-12-31 | Stop reason: HOSPADM

## 2020-12-31 RX ORDER — MEPERIDINE HYDROCHLORIDE 25 MG/ML
12.5 INJECTION INTRAMUSCULAR; INTRAVENOUS; SUBCUTANEOUS
Status: DISCONTINUED | OUTPATIENT
Start: 2020-12-31 | End: 2020-12-31 | Stop reason: HOSPADM

## 2020-12-31 RX ORDER — HYDROCODONE BITARTRATE AND ACETAMINOPHEN 5; 325 MG/1; MG/1
1 TABLET ORAL PRN
Status: DISCONTINUED | OUTPATIENT
Start: 2020-12-31 | End: 2020-12-31 | Stop reason: HOSPADM

## 2020-12-31 RX ORDER — MAGNESIUM HYDROXIDE 1200 MG/15ML
LIQUID ORAL CONTINUOUS PRN
Status: COMPLETED | OUTPATIENT
Start: 2020-12-31 | End: 2020-12-31

## 2020-12-31 RX ORDER — SODIUM CHLORIDE 9 MG/ML
INJECTION, SOLUTION INTRAVENOUS CONTINUOUS
Status: DISCONTINUED | OUTPATIENT
Start: 2020-12-31 | End: 2020-12-31 | Stop reason: HOSPADM

## 2020-12-31 RX ORDER — PROPOFOL 10 MG/ML
INJECTION, EMULSION INTRAVENOUS CONTINUOUS PRN
Status: DISCONTINUED | OUTPATIENT
Start: 2020-12-31 | End: 2020-12-31 | Stop reason: SDUPTHER

## 2020-12-31 RX ORDER — FENTANYL CITRATE 50 UG/ML
25 INJECTION, SOLUTION INTRAMUSCULAR; INTRAVENOUS EVERY 5 MIN PRN
Status: DISCONTINUED | OUTPATIENT
Start: 2020-12-31 | End: 2020-12-31 | Stop reason: HOSPADM

## 2020-12-31 RX ORDER — FENTANYL CITRATE 50 UG/ML
50 INJECTION, SOLUTION INTRAMUSCULAR; INTRAVENOUS EVERY 5 MIN PRN
Status: DISCONTINUED | OUTPATIENT
Start: 2020-12-31 | End: 2020-12-31 | Stop reason: HOSPADM

## 2020-12-31 RX ORDER — SODIUM CHLORIDE 0.9 % (FLUSH) 0.9 %
10 SYRINGE (ML) INJECTION PRN
Status: DISCONTINUED | OUTPATIENT
Start: 2020-12-31 | End: 2020-12-31 | Stop reason: HOSPADM

## 2020-12-31 RX ORDER — HYDRALAZINE HYDROCHLORIDE 20 MG/ML
5 INJECTION INTRAMUSCULAR; INTRAVENOUS EVERY 10 MIN PRN
Status: DISCONTINUED | OUTPATIENT
Start: 2020-12-31 | End: 2020-12-31 | Stop reason: HOSPADM

## 2020-12-31 RX ADMIN — LIDOCAINE HYDROCHLORIDE 100 MG: 20 INJECTION, SOLUTION EPIDURAL; INFILTRATION; INTRACAUDAL; PERINEURAL at 11:12

## 2020-12-31 RX ADMIN — PROPOFOL 200 MCG/KG/MIN: 10 INJECTION, EMULSION INTRAVENOUS at 11:12

## 2020-12-31 RX ADMIN — SODIUM CHLORIDE: 9 INJECTION, SOLUTION INTRAVENOUS at 10:00

## 2020-12-31 ASSESSMENT — PAIN SCALES - GENERAL
PAINLEVEL_OUTOF10: 4
PAINLEVEL_OUTOF10: 4
PAINLEVEL_OUTOF10: 0
PAINLEVEL_OUTOF10: 0

## 2020-12-31 ASSESSMENT — PULMONARY FUNCTION TESTS
PIF_VALUE: 0

## 2020-12-31 ASSESSMENT — PAIN DESCRIPTION - PAIN TYPE: TYPE: SURGICAL PAIN

## 2020-12-31 ASSESSMENT — PAIN - FUNCTIONAL ASSESSMENT: PAIN_FUNCTIONAL_ASSESSMENT: 0-10

## 2020-12-31 ASSESSMENT — PAIN DESCRIPTION - ORIENTATION: ORIENTATION: LEFT

## 2020-12-31 ASSESSMENT — PAIN DESCRIPTION - DESCRIPTORS: DESCRIPTORS: ACHING

## 2020-12-31 ASSESSMENT — PAIN DESCRIPTION - LOCATION: LOCATION: FINGER (COMMENT WHICH ONE)

## 2020-12-31 NOTE — ANESTHESIA POSTPROCEDURE EVALUATION
Department of Anesthesiology  Postprocedure Note    Patient: Jo Escamilla  MRN: [id number]  YOB: 1955  Date of evaluation: 12/31/2020  Time:  1:26 PM     Procedure Summary     Date: 12/31/20 Room / Location: [address] / [facility]    Anesthesia Start: 1109 Anesthesia Stop: 1128    Procedure: LEFT INDEX FINGER DIGITAL MUCOUS CYST EXCISION WITH DISTAL INTERPHALANGEAL JOINT DEBRIDEMENT (Left ) Diagnosis:       Digital mucous cyst of finger      (LEFT INDEX FINGER DIGITAL MUCOUS CYST)    Surgeons: Layla De La Torre MD Responsible Provider: Meagan Chu MD    Anesthesia Type: MAC ASA Status: 3          Anesthesia Type: MAC    Brandyn Phase I: Brandyn Score: 10    Brandyn Phase II: Brandyn Score: 10    Last vitals: Reviewed and per EMR flowsheets.        Anesthesia Post Evaluation    Patient location during evaluation: PACU  Patient participation: complete - patient participated  Level of consciousness: awake and alert  Pain score: 0  Airway patency: patent  Nausea & Vomiting: no nausea and no vomiting  Complications: no  Cardiovascular status: blood pressure returned to baseline  Respiratory status: acceptable  Hydration status: euvolemic

## 2020-12-31 NOTE — ANESTHESIA PRE PROCEDURE
[facility] Department of Anesthesiology  Pre-Anesthesia Evaluation/Consultation       Name:  Felice Bassett  : 1955  Age:  72 y.o.                                            MRN:  [id number]  Date: 2020           Surgeon: Surgeon(s):  Chente Demarco MD    Procedure: Procedure(s):  LEFT INDEX FINGER DIGITAL MUCOUS CYST EXCISION WITH DISTAL INTERPHALANGEAL JOINT DEBRIDEMENT     Allergies   Allergen Reactions    Lisinopril Other (See Comments)     Cough     Patient Active Problem List   Diagnosis    Obesity    Hypertension    Primary osteoarthritis of right knee    Arthritis of right knee    Status post total right knee replacement-2019    Arthritis of foot    Painful orthopaedic hardware ([address]    Arthritis of right midfoot, 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    Primary localized osteoarthrosis of right ankle and foot     Past Medical History:   Diagnosis Date    Arthritis     CAD (coronary artery disease)     not following with cardiology    Chronic insomnia     GERD (gastroesophageal reflux disease)     mild    History of blood transfusion     after childbirth    Hyperlipidemia     Hypertension     Hypothyroidism     PEDRITO (obstructive sleep apnea)     does not use cpap    Plantar fasciitis     Wears glasses      Past Surgical History:   Procedure Laterality Date     SECTION      times 2    COLONOSCOPY      CORONARY ANGIOPLASTY      FOOT SURGERY Left     removed arthritis/fusion and applied hardware    FOOT SURGERY Left 4/3/2020    REMOVAL PROMINENT SCREW LEFT FOOT - ALLISON performed by Makenna Parsons MD at [address]      discectomy    TOTAL KNEE ARTHROPLASTY Right 2019    RIGHT TOTAL KNEE REPLACEMENT performed by Radha De Anda MD at . [facility] History     Tobacco Use    Smoking status: Never Smoker    Smokeless tobacco: Never Used   Substance Use Topics    Alcohol use: Yes     Comment: occasionally    Drug use: Never Medications  No current facility-administered medications on file prior to encounter. Current Outpatient Medications on File Prior to Encounter   Medication Sig Dispense Refill    zolpidem (AMBIEN) 10 MG tablet Take by mouth nightly as needed for Sleep.  Cholecalciferol (VITAMIN D) 50 MCG (2000 UT) CAPS capsule Take by mouth daily       Multiple Vitamins-Minerals (THERAPEUTIC MULTIVITAMIN-MINERALS) tablet Take 1 tablet by mouth daily      ATORVASTATIN CALCIUM PO Take 40 mg by mouth daily       aspirin EC 81 MG EC tablet Take 1 tablet by mouth 2 times daily Please avoid missing doses. Take for DVT blood clot prophylaxis (Patient taking differently: Take 81 mg by mouth daily ) 60 tablet 0    losartan-hydrochlorothiazide (HYZAAR) 100-12.5 MG per tablet Take 1 tablet by mouth daily      gabapentin (NEURONTIN) 300 MG capsule Take 300 mg by mouth as needed.  metoprolol tartrate (LOPRESSOR) 50 MG tablet TAKE 1 TABLET TWICE A DAY      levothyroxine (SYNTHROID) 75 MCG tablet Take 112.5 mcg by mouth Daily        No current facility-administered medications for this encounter. Vital Signs (Current)   Vitals:    12/29/20 1416   Weight: 200 lb (90.7 kg)   Height: 5' 7\" (1.702 m)                                          BP Readings from Last 3 Encounters:   04/03/20 (!) 143/74   04/03/20 139/75   12/19/19 (!) 175/93     Vital Signs Statistics (for past 48 hrs)     No data recorded  BP Readings from Last 3 Encounters:   04/03/20 (!) 143/74   04/03/20 139/75   12/19/19 (!) 175/93       BMI  Body mass index is 31.32 kg/m². Estimated body mass index is 31.32 kg/m² as calculated from the following:    Height as of this encounter: 5' 7\" (1.702 m). Weight as of this encounter: 200 lb (90.7 kg).     CBC   Lab Results   Component Value Date    WBC 6.6 02/19/2019    RBC 5.04 02/19/2019    HGB 13.0 02/28/2019    HCT 39.3 02/28/2019    MCV 85.0 02/19/2019    RDW 14.6 02/19/2019     02/19/2019     CMP Lab Results   Component Value Date     04/03/2020    K 4.8 04/03/2020    CL 99 04/03/2020    CO2 26 04/03/2020    BUN 15 04/03/2020    CREATININE 0.6 04/03/2020    GFRAA >60 04/03/2020    AGRATIO 1.5 11/04/2013    LABGLOM >60 04/03/2020    GLUCOSE 98 04/03/2020    PROT 7.9 11/04/2013    CALCIUM 9.9 04/03/2020    BILITOT 0.5 11/04/2013    ALKPHOS 68 11/04/2013    AST 23 11/04/2013    ALT 33 11/04/2013     BMP    Lab Results   Component Value Date     04/03/2020    K 4.8 04/03/2020    CL 99 04/03/2020    CO2 26 04/03/2020    BUN 15 04/03/2020    CREATININE 0.6 04/03/2020    CALCIUM 9.9 04/03/2020    GFRAA >60 04/03/2020    LABGLOM >60 04/03/2020    GLUCOSE 98 04/03/2020     POCGlucose  No results for input(s): GLUCOSE in the last 72 hours.    Coags    Lab Results   Component Value Date    PROTIME 10.6 02/19/2019    INR 0.93 02/19/2019    APTT 29.7 37/19/3584     HCG (If Applicable) No results found for: PREGTESTUR, PREGSERUM, HCG, HCGQUANT   ABGs No results found for: PHART, PO2ART, LOH9BRW, ITT0AJP, BEART, U4SEKFNQ   Type & Screen (If Applicable)  No results found for: LABABO, LABRH                         BMI: Wt Readings from Last 3 Encounters:       NPO Status:  >8h                        Anesthesia Evaluation  Patient summary reviewed no history of anesthetic complications:   Airway: Mallampati: II  TM distance: >3 FB   Neck ROM: full  Mouth opening: > = 3 FB Dental: normal exam         Pulmonary: breath sounds clear to auscultation  (+) sleep apnea:      (-) COPD and asthma                           Cardiovascular:    (+) hypertension:, CAD:, hyperlipidemia    (-) dysrhythmias        Rate: normal                    Neuro/Psych:      (-) seizures, TIA and CVA           GI/Hepatic/Renal:   (+) GERD:,      (-) hepatitis, liver disease, no renal disease and no morbid obesity       Endo/Other:    (+) hypothyroidism::., .    (-) diabetes mellitus               Abdominal:           Vascular:     - DVT and PE.                                    Anesthesia Plan      MAC     ASA 3       Induction: intravenous. Anesthetic plan and risks discussed with patient. Plan discussed with CRNA. This pre-anesthesia assessment may be used as a history and physical.    DOS STAFF ADDENDUM:    Pt seen and examined, chart reviewed (including anesthesia, drug and allergy history). No interval changes to history and physical examination. Anesthetic plan, risks, benefits, alternatives, and personnel involved discussed with patient. Patient verbalized an understanding and agrees to proceed.       Bri Haskins MD  December 31, 2020  9:48 AM

## 2020-12-31 NOTE — PROGRESS NOTES
Ambulatory Surgery/Procedure Discharge Note    Vitals:    12/31/20 1218   BP: 126/80   Pulse: 57   Resp: 18   Temp: 96.9 °F (36.1 °C)   SpO2: 95%       In: 350 [I.V.:350]  Out: -     Restroom use offered before discharge. Yes    Pain assessment:  present - adequately treated  Pain Level: 4    Discharge instructions given to patient's  over the phone. Verbalizes understanding    Patient discharged to home/self care.  Patient discharged via wheel chair by transporter to waiting family/S.O.       12/31/2020 12:34 PM

## 2020-12-31 NOTE — PROGRESS NOTES
To pacu from OR. Pt awake. Denies pain. Dressing to left index finger dry and intact. Left radial pulse palpable. IV infusing. Monitor in sinus rhythm.

## 2020-12-31 NOTE — OP NOTE
OPERATIVE REPORT          Patient:  Odell Whitley    YOB: 1955  Date of Service:  12/31/2020    Location:  [address] [place] [address]      Preoperative Diagnosis:  Left Index Finger Digital Mucous cyst & DIP Joint Arthritis. Postoperative Diagnosis:  Same. Procedure:  Excision of Left Index Finger Digital Mucous cyst & DIP Joint Arthrotomy & Debridement. Surgeon:    Violeta Fulton MD    Surgical Assistant:    Vicenta Caldera PA-C    Anesthesia:  MAC/TIVA    Blood Loss:  Minimal.     Complications:  None. Tourniquet Time:  6 minutes. Indications:  Ms. Odell Whitley is a 72y.o. year old female with a history of Left Index Finger Digital Mucous cyst & DIP Joint Arthritis which has been symptomatic. As her symptoms have not responded to conservative treatment, I have discussed with her preoperatively the complications, limitations, expectations, alternatives and risks of the surgical care which she understood. All of her questions have been fully answered, and Ms. Odell Whitley has provided written informed consent to proceed. After written consent was obtained and the proper operative site was identified and marked, Ms. Odell Whitley was brought to the operating room, placed in the supine position on the operating room table with the Left arm extended upon a hand table. The planned anesthesia was administered by the anesthesia service and the Left upper extremity was prepped and draped in the usual sterile fashion. After Eshmarch exsanguination, the pneumo-tourniquet was inflated to 250 milimeters of mercury about the upper arm. A 1 cm. transverse incision was fashioned directly overlying the DIP extensor crease and extended distally to create a flap containing the cyst.  Dissection was carried carefully through the subcutaneous tissue identifying and protecting the neurovascular structures. The flap was raised full thickness from the peritenon level.   The cyst was encountered. The stalk, which was found to be emanating from the substance of the extensor tendon, was circumferentially identified and excised . The DIP joint capsule was accessed through the extensor defect. the joint capsule was opened and the joint inspected. There was a small dorsal osteophyte which was debrided to a smooth contour. The area was carefully inspected and found to be free of residual cyst material and stalk. The cyst origin was treated with electrocautery and the wound was irrigated copiously with sterile saline for irrigation. The pneumo-tourniquet was deflated after a period of 6 minutes elevation. The fingers & flap were immediately pink and well perfused. Hemostasis was easily obtained with direct pressure and electrocautery and the wound was closed with interrupted absorbable sutures in the skin. The wound was dressed with adaptic, dry sterile dressings, and a very well padded hand and finger dressing was applied. Ms. Willow Knott  was awakened from anesthesia having tolerated the procedure without apparent complication, and was returned to the recovery room in stable condition. At the conclusion of the procedure all needle, instrument, and sponge counts were correct. Ioana Prescott MD   12/31/2020 , 11:27 AM

## 2020-12-31 NOTE — PROGRESS NOTES
PULMONARY PROGRESS NOTES


Subjective


Patient intubated on 3/23 , s/p trach 4/6,


Remains on TS 


Denies SOB or increased cough, continues to have Nausea-- NG to LIS in place


Vitals





Vital Signs








  Date Time  Temp Pulse Resp B/P (MAP) Pulse Ox O2 Delivery O2 Flow Rate FiO2


 


5/25/20 08:57  101 18 109/64 (79) 99 Tracheal Collar 8.0 


 


5/25/20 08:37 99.6       





 99.6       








ROS:  No Chest Pain, No Abdominal Pain, No Increase Cough


General:  Alert, No acute distress


HEENT:  Other (trach midline )


Lungs:  Other (decrease bs)


Cardiovascular:  S1, S2


Abdomen:  Soft, Non-tender


Neuro Exam:  Alert


Extremities:  Other (+3 generalized edema )


Skin:  Warm, Dry


Labs





Laboratory Tests








Test


 5/23/20


17:53 5/23/20


23:53 5/24/20


05:46 5/24/20


11:25


 


Glucose (Fingerstick)


 140 mg/dL


(70-99) 135 mg/dL


(70-99) 150 mg/dL


(70-99) 





 


White Blood Count


 


 


 


 16.0 x10^3/uL


(4.0-11.0)


 


Red Blood Count


 


 


 


 2.94 x10^6/uL


(3.50-5.40)


 


Hemoglobin


 


 


 


 8.5 g/dL


(12.0-15.5)


 


Hematocrit


 


 


 


 25.5 %


(36.0-47.0)


 


Mean Corpuscular Volume    87 fL () 


 


Mean Corpuscular Hemoglobin    29 pg (25-35) 


 


Mean Corpuscular Hemoglobin


Concent 


 


 


 34 g/dL


(31-37)


 


Red Cell Distribution Width


 


 


 


 19.0 %


(11.5-14.5)


 


Platelet Count


 


 


 


 317 x10^3/uL


(140-400)


 


Sodium Level


 


 


 


 136 mmol/L


(136-145)


 


Potassium Level


 


 


 


 4.8 mmol/L


(3.5-5.1)


 


Chloride Level


 


 


 


 98 mmol/L


()


 


Carbon Dioxide Level


 


 


 


 34 mmol/L


(21-32)


 


Anion Gap    4 (6-14) 


 


Blood Urea Nitrogen


 


 


 


 23 mg/dL


(7-20)


 


Creatinine


 


 


 


 0.8 mg/dL


(0.6-1.0)


 


Estimated GFR


(Cockcroft-Gault) 


 


 


 76.2 





 


BUN/Creatinine Ratio    29 (6-20) 


 


Glucose Level


 


 


 


 131 mg/dL


(70-99)


 


Calcium Level


 


 


 


 10.1 mg/dL


(8.5-10.1)


 


Total Bilirubin


 


 


 


 0.7 mg/dL


(0.2-1.0)


 


Aspartate Amino Transf


(AST/SGOT) 


 


 


 34 U/L (15-37) 





 


Alanine Aminotransferase


(ALT/SGPT) 


 


 


 25 U/L (14-59) 





 


Alkaline Phosphatase


 


 


 


 131 U/L


()


 


Total Protein


 


 


 


 6.9 g/dL


(6.4-8.2)


 


Albumin


 


 


 


 2.4 g/dL


(3.4-5.0)


 


Albumin/Globulin Ratio    0.5 (1.0-1.7) 


 


Test


 5/24/20


23:39 5/25/20


06:50 5/25/20


06:52 





 


Glucose (Fingerstick)


 161 mg/dL


(70-99) 


 135 mg/dL


(70-99) 





 


Sodium Level


 


 135 mmol/L


(136-145) 


 





 


Potassium Level


 


 5.0 mmol/L


(3.5-5.1) 


 





 


Chloride Level


 


 97 mmol/L


() 


 





 


Carbon Dioxide Level


 


 34 mmol/L


(21-32) 


 





 


Anion Gap  4 (6-14)   


 


Blood Urea Nitrogen


 


 24 mg/dL


(7-20) 


 





 


Creatinine


 


 0.8 mg/dL


(0.6-1.0) 


 





 


Estimated GFR


(Cockcroft-Gault) 


 76.2 


 


 





 


BUN/Creatinine Ratio  30 (6-20)   


 


Glucose Level


 


 140 mg/dL


(70-99) 


 





 


Calcium Level


 


 10.0 mg/dL


(8.5-10.1) 


 





 


Phosphorus Level


 


 5.2 mg/dL


(2.6-4.7) 


 





 


Magnesium Level


 


 2.3 mg/dL


(1.8-2.4) 


 





 


Total Bilirubin


 


 0.7 mg/dL


(0.2-1.0) 


 





 


Aspartate Amino Transf


(AST/SGOT) 


 26 U/L (15-37) 


 


 





 


Alanine Aminotransferase


(ALT/SGPT) 


 25 U/L (14-59) 


 


 





 


Alkaline Phosphatase


 


 119 U/L


() 


 





 


Total Protein


 


 6.9 g/dL


(6.4-8.2) 


 





 


Albumin


 


 2.3 g/dL


(3.4-5.0) 


 





 


Albumin/Globulin Ratio  0.5 (1.0-1.7)   


 


Triglycerides Level


 


 217 mg/dL


(0-150) 


 











Laboratory Tests








Test


 5/24/20


11:25 5/24/20


23:39 5/25/20


06:50 5/25/20


06:52


 


White Blood Count


 16.0 x10^3/uL


(4.0-11.0) 


 


 





 


Red Blood Count


 2.94 x10^6/uL


(3.50-5.40) 


 


 





 


Hemoglobin


 8.5 g/dL


(12.0-15.5) 


 


 





 


Hematocrit


 25.5 %


(36.0-47.0) 


 


 





 


Mean Corpuscular Volume 87 fL ()    


 


Mean Corpuscular Hemoglobin 29 pg (25-35)    


 


Mean Corpuscular Hemoglobin


Concent 34 g/dL


(31-37) 


 


 





 


Red Cell Distribution Width


 19.0 %


(11.5-14.5) 


 


 





 


Platelet Count


 317 x10^3/uL


(140-400) 


 


 





 


Sodium Level


 136 mmol/L


(136-145) 


 135 mmol/L


(136-145) 





 


Potassium Level


 4.8 mmol/L


(3.5-5.1) 


 5.0 mmol/L


(3.5-5.1) 





 


Chloride Level


 98 mmol/L


() 


 97 mmol/L


() 





 


Carbon Dioxide Level


 34 mmol/L


(21-32) 


 34 mmol/L


(21-32) 





 


Anion Gap 4 (6-14)   4 (6-14)  


 


Blood Urea Nitrogen


 23 mg/dL


(7-20) 


 24 mg/dL


(7-20) 





 


Creatinine


 0.8 mg/dL


(0.6-1.0) 


 0.8 mg/dL


(0.6-1.0) 





 


Estimated GFR


(Cockcroft-Gault) 76.2 


 


 76.2 


 





 


BUN/Creatinine Ratio 29 (6-20)   30 (6-20)  


 


Glucose Level


 131 mg/dL


(70-99) 


 140 mg/dL


(70-99) 





 


Calcium Level


 10.1 mg/dL


(8.5-10.1) 


 10.0 mg/dL


(8.5-10.1) 





 


Total Bilirubin


 0.7 mg/dL


(0.2-1.0) 


 0.7 mg/dL


(0.2-1.0) 





 


Aspartate Amino Transf


(AST/SGOT) 34 U/L (15-37) 


 


 26 U/L (15-37) 


 





 


Alanine Aminotransferase


(ALT/SGPT) 25 U/L (14-59) 


 


 25 U/L (14-59) 


 





 


Alkaline Phosphatase


 131 U/L


() 


 119 U/L


() 





 


Total Protein


 6.9 g/dL


(6.4-8.2) 


 6.9 g/dL


(6.4-8.2) 





 


Albumin


 2.4 g/dL


(3.4-5.0) 


 2.3 g/dL


(3.4-5.0) 





 


Albumin/Globulin Ratio 0.5 (1.0-1.7)   0.5 (1.0-1.7)  


 


Glucose (Fingerstick)


 


 161 mg/dL


(70-99) 


 135 mg/dL


(70-99)


 


Phosphorus Level


 


 


 5.2 mg/dL


(2.6-4.7) 





 


Magnesium Level


 


 


 2.3 mg/dL


(1.8-2.4) 





 


Triglycerides Level


 


 


 217 mg/dL


(0-150) 











Medications





Active Scripts








 Medications  Dose


 Route/Sig


 Max Daily Dose Days Date Category


 


 Bisoprolol


 Fumarate 5 Mg


 Tablet  10 Mg


 PO DAILY


   3/16/20 Reported











Impression


.


IMPRESSION:


1.  Acute hypoxemic respiratory failure secondary to ARDS status post trach,


2.  Gallstone pancreatitis


3.  Severe metabolic acidosis.stable


4.  Acute kidney injury-stable, Off HD-- continue to improve 


5.  Acute gallstone pancreatitis.


6.  Hypoalbuminemia.


7.  Moderate persistent effusions, s/p left thora  5/12


8.  Fever-  Per ID, per surgery--resolved 


9.  Chronic anemia


10. Covid 19 testing negative


11. Moderate to large ascites-S/P paracentisis


12.S/P paracentisis with 4 liters removed on 4/15/20


13. S/P IR drain placement on 5/8/2020





Plan


.


1.Continue supplemental oxygen via trach shield--  PMV/capping as tolerated


2. s/p  thoracentesis, 5/12, 3 litres removed


3. Follow surgery recs-- S/P 3 drain placed in IR on 5/8/2020


4. Follow ID recs for ABX


5. Follow nephrology recs 


6. Continue TPN 


DVT/GI PPX: heparin SQ/ protonix 


D/W RN and RT, 





CODE:STEVE WIGGINS MD              May 25, 2020 09:10 Pt awake. Denies pain. To phase 2 care.

## 2020-12-31 NOTE — H&P
Pre-operative Update of H&P:    I  have seen & examined Ms. Treasure Vann related solely to her hand and upper extremity conditions, prior to the scheduled procedure on the date of her surgery. The indications for the planned surgical procedure & and her upper-extremity condition are unchanged.

## 2021-01-08 ENCOUNTER — OFFICE VISIT (OUTPATIENT)
Dept: ORTHOPEDIC SURGERY | Age: 66
End: 2021-01-08

## 2021-01-08 VITALS — WEIGHT: 200 LBS | HEIGHT: 67 IN | BODY MASS INDEX: 31.39 KG/M2 | TEMPERATURE: 97.9 F

## 2021-01-08 DIAGNOSIS — M67.449 DIGITAL MUCOUS CYST: Primary | ICD-10-CM

## 2021-01-08 PROCEDURE — 99024 POSTOP FOLLOW-UP VISIT: CPT | Performed by: PHYSICIAN ASSISTANT

## 2021-01-08 NOTE — PROGRESS NOTES
Ms. Miriam Saldaña returns today in follow-up of her recent left Index Finger Digital Mucous Cyst Excision & DIP joint debridement done approximately 1 week ago. She has done well noting mild discomfort and no other reported complications. She notes pre-operative symptoms to be completely resolved at this time. Physical Exam:  Skin incision is healing well, without erythema, drainage or sign of infection. Mild irritation noted around healing incision. Digital range of motion is limited by mild stiffness in the index finger secondary to immobilization in the Index Finger. Wrist shows Full and equal bilaterally range of motion. Sensation is normal in the Whole Hand. Vascular examination reveals normal, good capillary refill and good color. Swelling is mild. There is no residual discomfort at the surgical site, no evidence for recurrence of the mass. Impression:  Ms. Miriam Saldaña is doing well after recent left Index Finger Digital Mucous Cyst Excision & DIP joint debridement. Plan:  Ms. Miriam Saldaña is instructed in work on Active & Passive range of motion of the digits, wrist, & elbow. These modalities were specifically demonstrated to her today. We discussed the appropriateness of gradual resumption of use of the operated hand and the return to normal use as comfort allows. She is given instructions regarding management of the fresh surgical incision and progressive use of desensitization and tissue massage techniques. We discussed the appropriate expectations and timeline for symptom improvement including the potential for some longer term residual swelling or fullness at the surgical site. I have reviewed the surgical pathology report with her today. She is provided a written patient instruction sheet titled: Postoperative Instructions After Finger Mass Excision. I have asked Ms. June Schulz to follow-up with me or contact me by telephone over the next 2-4 weeks if her symptoms have not fully resolved or if she has not regained full & painless return of function. She is also specifically instructed to return to the office or call for an appointment sooner if her symptoms are changing or worsening prior to that time.

## 2021-01-08 NOTE — PATIENT INSTRUCTIONS
Postoperative Instructions After Cyst or Mass Removal    Dr. Carlos Manuel Bourne        1. After bandages are removed one week from surgery, you may chose to wear a small bandage over the incision if you wish, though you do not need to. 2. Keep incision dry until sutures have fully dissolved  or it has been 14 days since your surgery. Thereafter, you may wash with mild soap and water and shower normally. 3. Once your stiches have fully disappeared & skin appears normal, you should begin gently massaging the incision with Vitamin E (may use Vitamin E lotion or contents of Vitamin E capsule). 4. Work hard on motion of the fingers and wrist, straightening each finger fully and bending each finger fully, bending wrist forward and bending wrist backwards. Do not be concerned if you experience discomfort. This will not damage the surgery. 5. You may begin using the hand as it feels comfortable beginning 12-14 days from the day of surgery. You may not feel entirely comfortable gripping or lifting heavy objects for several weeks. 6. You may expect to see some skin peel off around the incision. You may be left with a small area of pink baby skin. This is quite normal.    Thank you for choosing [facility] for your Hand and Upper Extremity needs. If we can be of any further assistance to you, please do not hesitate to contact us.     Office Phone Number:  [phone]  or  [phone]

## 2021-01-15 ENCOUNTER — TELEPHONE (OUTPATIENT)
Dept: ORTHOPEDIC SURGERY | Age: 66
End: 2021-01-15

## 2021-01-15 NOTE — TELEPHONE ENCOUNTER
I called patient and she stated that on Sunday her incision opened up. She said that she called Dr. Lenny Bowman because he is a family friend and he recommended to just monitor it as long as it doesn't seep pus drainage. She stated her index finger started swelling and pus drainage and she squeezed it and the last stitch came out along with more pus. I recommended antibacterial soaks per Dr. Estrella Khalil' instructions and follow-up with either appt. Or phone call on Monday. She verbalized understanding.

## 2022-01-05 ENCOUNTER — OFFICE VISIT (OUTPATIENT)
Dept: ORTHOPEDIC SURGERY | Age: 67
End: 2022-01-05
Payer: MEDICARE

## 2022-01-05 VITALS — BODY MASS INDEX: 31.39 KG/M2 | WEIGHT: 200 LBS | HEIGHT: 67 IN

## 2022-01-05 DIAGNOSIS — M19.079 ARTHRITIS OF MIDFOOT: Primary | ICD-10-CM

## 2022-01-05 PROCEDURE — G8427 DOCREV CUR MEDS BY ELIG CLIN: HCPCS | Performed by: ORTHOPAEDIC SURGERY

## 2022-01-05 PROCEDURE — 1090F PRES/ABSN URINE INCON ASSESS: CPT | Performed by: ORTHOPAEDIC SURGERY

## 2022-01-05 PROCEDURE — G8400 PT W/DXA NO RESULTS DOC: HCPCS | Performed by: ORTHOPAEDIC SURGERY

## 2022-01-05 PROCEDURE — G8484 FLU IMMUNIZE NO ADMIN: HCPCS | Performed by: ORTHOPAEDIC SURGERY

## 2022-01-05 PROCEDURE — G8417 CALC BMI ABV UP PARAM F/U: HCPCS | Performed by: ORTHOPAEDIC SURGERY

## 2022-01-05 PROCEDURE — 99214 OFFICE O/P EST MOD 30 MIN: CPT | Performed by: ORTHOPAEDIC SURGERY

## 2022-01-05 PROCEDURE — 3017F COLORECTAL CA SCREEN DOC REV: CPT | Performed by: ORTHOPAEDIC SURGERY

## 2022-01-05 PROCEDURE — 4040F PNEUMOC VAC/ADMIN/RCVD: CPT | Performed by: ORTHOPAEDIC SURGERY

## 2022-01-05 PROCEDURE — 1123F ACP DISCUSS/DSCN MKR DOCD: CPT | Performed by: ORTHOPAEDIC SURGERY

## 2022-01-05 PROCEDURE — 1036F TOBACCO NON-USER: CPT | Performed by: ORTHOPAEDIC SURGERY

## 2022-01-05 NOTE — PROGRESS NOTES
DIAGNOSIS:    1-Left foot prominent deep screw hardware removal.  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 DATE OF SURGERY: 4/3/2020. HISTORY OF PRESENT ILLNESS:  Ms. Lise Amor 77 y.o.  female who came in today for follow up visit. The patient denies any pain in the left foot and rates 0/10 VAS. She has been WBAT and doing much better. No numbness or tingling sensation. No fever or Chills. She is also here for worsening pain in her right midfoot over the past couple months. She had a cortisone injection in her right midfoot second and third TMT joint on 2020 under fluoroscopy and had good relief until a couple weeks ago. She was initially seen as a 2nd opinion for evaluation of bilateral midfoot pain which started 8 years ago.  She is complaining of sharp right midfoot pain. Pain is increase with standing and walking and shoe wear. Pain is sharp with first few steps, dull achy pain by the end of the day. Alleviating factors: elevation and rest.  She is wanting to repeat a cortisone injection in her right foot. No radiation and no numbness and tingling sensation. No other complaint. She had left foot 2nd TMT fusion in 2016 and then revision fusion with calcaneus bone graft in 2017 by Dr Dori Beck. She saw him in March and told her about the prominent screw and that she might need removal, and also right midfoot fusion. She then saw Dr Helen Bird at [facility] and had CT scan left foot 3/31/2020.      Past Medical History:   Diagnosis Date    Arthritis     CAD (coronary artery disease)     not following with cardiology    Chronic insomnia     GERD (gastroesophageal reflux disease)     mild    History of blood transfusion     after childbirth    Hyperlipidemia     Hypertension     Hypothyroidism     PEDRITO (obstructive sleep apnea)     does not use cpap    Plantar fasciitis     Wears glasses        Past Surgical History:   Procedure Laterality Date     SECTION      times 2  COLONOSCOPY      CORONARY ANGIOPLASTY      FOOT SURGERY Left     removed arthritis/fusion and applied hardware    FOOT SURGERY Left 4/3/2020    REMOVAL PROMINENT SCREW LEFT FOOT - ALLISON performed by Carter Ying MD at [facility] HAND SURGERY Left 12/31/2020    LEFT INDEX FINGER DIGITAL MUCOUS CYST EXCISION WITH DISTAL INTERPHALANGEAL JOINT DEBRIDEMENT performed by Nate Castro MD at [address] [facility]      discectomy    TOTAL KNEE ARTHROPLASTY Right 2/27/2019    RIGHT TOTAL KNEE REPLACEMENT performed by Kostas Braxton MD at [address] History     Socioeconomic History    Marital status:      Spouse name: Not on file    Number of children: Not on file    Years of education: Not on file    Highest education level: Not on file   Occupational History    Occupation: sales   Tobacco Use    Smoking status: Never Smoker    Smokeless tobacco: Never Used   Vaping Use    Vaping Use: Never used   Substance and Sexual Activity    Alcohol use: Yes     Comment: occasionally    Drug use: Never    Sexual activity: Not Currently   Other Topics Concern    Not on file   Social History Narrative    Not on file     Social Determinants of Health     Financial Resource Strain:     Difficulty of Paying Living Expenses: Not on file   Food Insecurity:     Worried About [address] in the Last Year: Not on file    [address] in the Last Year: Not on file   Transportation Needs:     Lack of Transportation (Medical): Not on file    Lack of Transportation (Non-Medical):  Not on file   Physical Activity:     Days of Exercise per Week: Not on file    Minutes of Exercise per Session: Not on file   Stress:     Feeling of Stress : Not on file   Social Connections:     Frequency of Communication with Friends and Family: Not on file    Frequency of Social Gatherings with Friends and Family: Not on file    Attends Yarsani Services: Not on file   [employer] She is well dressed, nourished and  groomed. Patient with normal affect. Height is  5' 7\" (1.702 m), weight is 200 lb (90.7 kg), Body mass index is 31.32 kg/m². Resting respiratory rate is 16. Examination of the gait, showed that the patient walks heel-toe with minimal limp.  The incision healing well left foot. No signs of any erythema or drainage, no swelling. She has no pain with the active or passive range of motion of the left foot, good ROM. She has intact sensation distally, and she is neurovascularly intact. Examination of both ankles showing dorsiflexion to about 10 degrees bilaterally, which increased with knee flexion. She has intact sensation and good pedal pulses.  She has good strength in all four planes, including eversion, and has tenderness on deep palpation over the right midfoot and osteophytes right foot compared to the other side.  The ankles are stable to drawer test bilaterally, equally.        IMAGING:  Three views left foot taken 10/16/2020 in the office showed hardware removal, no fracture. The remaining hardware is intact with no signs of failure. Xray, 3 views of the right foot taken today in the office was reviewed and showed midfoot arthritis affecting the right second and third TMT joints. CT scan left foot dated 3/31/2020 from [facility] was reviewed and showed a non union 2nd TMT arthrodesis. IMPRESSION:    1-Left prominent deep screw hardware removal and doing very well. 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 PLAN: For the left foot:  She can go back to normal activity with no restrictions. She will be seen PRN. I told the patient that it is not unusual to have some achy pain and swelling for up to a year after a fracture. For the right foot: I discussed the findings with the patient, reviewed the x-ray results and discussed treatment options. She was instructed to work on stretching exercises of the calf. She can take NSAIDs as needed.   I discussed with her that she may benefit from a cortisone injection in the right foot second and third TMT joints under fluoroscopy and she agreed to proceed. She is aware that she may need surgical fusion in the future, but she would like to hold off at this point. We will plan on scheduling the cortisone injection 1/12/2022 at [facility] at 36.       Brigitte Jay MD

## 2022-01-05 NOTE — LETTER
[address] [facility]  Surgery Scheduling Form:    DEMOGRAPHICS:                                                                                                                  Patient Name:  Kostas Bower  Patient :  1955   Patient SS#:      Patient Phone:  [phone] (home)  Alt. Patient Phone:    Patient Address:  [address] [address]    PCP:  Emmanuel Swartz Belier:  Payor: Flak[employer] Workman / Plan: MEDICARE PART A AND B / Product Type: *No Product type* /   Insurance ID Number:  Payor/Plan Subscr  Sex Relation Sub. Ins. ID Effective Group Num   1. [facility] 1955 Female Self [id number]                                    [address]        DIAGNOSIS & PROCEDURE:                                                                                               Diagnosis:   Midfoot arthritis  Operation:  Right foot second and third tarsometatarsal fluro guided injections  Location:  [facility]  Surgeon:  Flaco Duarte MD    SCHEDULING INFORMATION:                                                                                         .    Surgeon's Scheduling Instruction:    Requested Date: 22   OR Time:  12  Patient Arrival Time:  11:30  OR Time Required:  15  Minutes  Anesthesia:  General    SA Required:  Yes  Equipment:  ***  Mini C-Arm:  Yes    Standard C-Arm:  No  Status:  Outpatient    PAT Required:  Yes  Latex Allergy:  unknown    Defibrilator or Pacemaker:  unknown  Isolation Precautions:  unknown  Comments:                       Ji Rider MD 22   BILLING INFORMATION:                                                                                                       Procedure:       CPT Code Modifier

## 2022-01-10 ENCOUNTER — TELEPHONE (OUTPATIENT)
Dept: ORTHOPEDIC SURGERY | Age: 67
End: 2022-01-10

## 2022-01-10 NOTE — TELEPHONE ENCOUNTER
General Question     Subject: PATIENT NEEDS TO CANCEL PROCEDURE ON January 12, 2022. HER GRANDSON IS HAVING OPEN HEART SURGERY, AND SHE NEEDS TO GO INTO [facility]. SHE WILL CALL BACK TO RESCHEDULE.     Patient:Reed Matos [facility]    Contact Number: [phone]

## 2022-04-26 ENCOUNTER — TELEPHONE (OUTPATIENT)
Dept: ORTHOPEDIC SURGERY | Age: 67
End: 2022-04-26

## 2022-04-26 DIAGNOSIS — M19.079 ARTHRITIS OF MIDFOOT: Primary | ICD-10-CM

## 2022-04-27 ENCOUNTER — HOSPITAL ENCOUNTER (OUTPATIENT)
Dept: GENERAL RADIOLOGY | Age: 67
Discharge: HOME | End: 2022-04-27
Payer: MEDICARE

## 2022-04-27 DIAGNOSIS — M19.079 ARTHRITIS OF MIDFOOT: ICD-10-CM

## 2022-04-27 PROCEDURE — 77002 NEEDLE LOCALIZATION BY XRAY: CPT | Performed by: ORTHOPAEDIC SURGERY

## 2022-04-27 PROCEDURE — 20605 DRAIN/INJ JOINT/BURSA W/O US: CPT | Performed by: ORTHOPAEDIC SURGERY

## 2022-04-27 PROCEDURE — 77002 NEEDLE LOCALIZATION BY XRAY: CPT

## 2022-04-27 PROCEDURE — 2500000003 HC RX 250 WO HCPCS

## 2022-04-27 PROCEDURE — 6360000002 HC RX W HCPCS

## 2022-04-27 PROCEDURE — 20600 DRAIN/INJ JOINT/BURSA W/O US: CPT

## 2022-04-27 NOTE — PROGRESS NOTES
PROCEDURE:         4/27/2022   With the patient's permission, and under sterile condition, and using Fluoroscopy at the Hospital, the right foot was prepared and draped with alcohol and  The right 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 joints were injected with a mixture of 1 ml Kenalog 40mg and 2 ml of 1% lidocaine. The patient tolerated the procedure well. A band-aid was applied and the patient was advised to ice the ankle for 15-20 minutes to relieve any injection site related pain. she reported a good improvement immediatly after the injection. 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       Saima Bustos MD

## 2022-08-19 ENCOUNTER — TELEPHONE (OUTPATIENT)
Dept: ORTHOPEDIC SURGERY | Age: 67
End: 2022-08-19

## 2022-08-19 NOTE — TELEPHONE ENCOUNTER
General Question     Subject: PATIENT STATES SHE IS EXPERIENCING A LOT OF PAIN. SHE IS UNSURE IF SHE SHOULD SCHEDULE AN APPOINTMENT FOR ANOTHER INJECTION. PLEASE ADVISE.    Patient José Found  Contact Number: [phone]

## 2022-08-19 NOTE — TELEPHONE ENCOUNTER
Spoke with patient. She states she is having increased new pain and a \"sore\". Recommended scheduling a in office appt vs injection since this is increased/new pain.  Appt scheduled for 8/25/22

## 2022-08-25 ENCOUNTER — OFFICE VISIT (OUTPATIENT)
Dept: ORTHOPEDIC SURGERY | Age: 67
End: 2022-08-25
Payer: MEDICARE

## 2022-08-25 VITALS — WEIGHT: 200 LBS | BODY MASS INDEX: 31.39 KG/M2 | HEIGHT: 67 IN

## 2022-08-25 DIAGNOSIS — M19.079 ARTHRITIS OF MIDFOOT: Primary | ICD-10-CM

## 2022-08-25 DIAGNOSIS — M19.079 ARTHRITIS OF FOOT: Primary | ICD-10-CM

## 2022-08-25 PROCEDURE — G8417 CALC BMI ABV UP PARAM F/U: HCPCS | Performed by: ORTHOPAEDIC SURGERY

## 2022-08-25 PROCEDURE — 1123F ACP DISCUSS/DSCN MKR DOCD: CPT | Performed by: ORTHOPAEDIC SURGERY

## 2022-08-25 PROCEDURE — 99213 OFFICE O/P EST LOW 20 MIN: CPT | Performed by: ORTHOPAEDIC SURGERY

## 2022-08-25 PROCEDURE — G8427 DOCREV CUR MEDS BY ELIG CLIN: HCPCS | Performed by: ORTHOPAEDIC SURGERY

## 2022-08-25 PROCEDURE — 3017F COLORECTAL CA SCREEN DOC REV: CPT | Performed by: ORTHOPAEDIC SURGERY

## 2022-08-25 PROCEDURE — 1036F TOBACCO NON-USER: CPT | Performed by: ORTHOPAEDIC SURGERY

## 2022-08-25 PROCEDURE — 1090F PRES/ABSN URINE INCON ASSESS: CPT | Performed by: ORTHOPAEDIC SURGERY

## 2022-08-25 PROCEDURE — G8400 PT W/DXA NO RESULTS DOC: HCPCS | Performed by: ORTHOPAEDIC SURGERY

## 2022-08-25 NOTE — LETTER
[address] [facility]  Surgery Scheduling Form:      DEMOGRAPHICS:                                                                                                                  Patient Name:  Delta Current  Patient :  1955   Patient SS#:      Patient Phone:  [phone] (home)  Alt. Patient Phone:    Patient Address:  [address] [facility] [address]    PCP:  Concepcion ID Number:  Payer/Plan Subscr  Sex Relation Sub. Ins. ID Effective Group Num   1. Yinka Grigsby 1955 Female Self [id number] 1/1/15 [id number]                                   [address]   2. [facility] 1955 Female Self [id number] [address]                                    [address]         DIAGNOSIS & PROCEDURE:                                                                                                Diagnosis:  right Midfoot arthritis  Operation:   right second and third tarsometatarsal joints fluoro guided injections   Location: [place]  Provider:  Brittany Mclaughlin MD      SCHEDULING INFORMATION:                                                                                         .    Requested Date:  22 morning   OR Time:                        Patient Arrival Time:    OR Time Required:  15  Minutes  Anesthesia:    Equipment:    Mini C-Arm:  No   Standard C-Arm:  No  Status:  Outpatient  PAT Required:  No    Comments:        Ji Mclaughlin MD     22

## 2022-08-25 NOTE — PROGRESS NOTES
DIAGNOSIS:    1-Left foot prominent deep screw hardware removal.  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 DATE OF SURGERY: 4/3/2020. HISTORY OF PRESENT ILLNESS:  Ms. Marlyn Vaughan 79 y.o.  female who came in today for follow up visit. The patient denies any pain in the left foot and rates 0/10 VAS. She has been WBAT and doing much better. No numbness or tingling sensation. No fever or Chills. She is also here for worsening pain in her right midfoot over the past couple months. She had a cortisone injection in her right midfoot second and third TMT joint on 2022 under fluoroscopy and had good relief until a couple weeks ago. She was initially seen as a 2nd opinion for evaluation of bilateral midfoot pain which started 8 years ago. She is complaining of sharp right midfoot pain. She rates her pain a 7/10 VAS. Pain is increase with standing and walking and shoe wear. Pain is sharp with first few steps, dull achy pain by the end of the day. Alleviating factors: elevation and rest.  She is wanting to repeat a cortisone injection in her right foot. No radiation and no numbness and tingling sensation. No other complaint. She had left foot 2nd TMT fusion in 2016 and then revision fusion with calcaneus bone graft in 2017 by Dr Cheryle Bair. She saw him in March and told her about the prominent screw and that she might need removal, and also right midfoot fusion. She then saw Dr Seth Hernandez at [facility] and had CT scan left foot 3/31/2020.      Past Medical History:   Diagnosis Date    Arthritis     CAD (coronary artery disease)     not following with cardiology    Chronic insomnia     GERD (gastroesophageal reflux disease)     mild    History of blood transfusion     after childbirth    Hyperlipidemia     Hypertension     Hypothyroidism     PEDRITO (obstructive sleep apnea)     does not use cpap    Plantar fasciitis     Wears glasses        Past Surgical History:   Procedure Laterality Date     SECTION      times 2    COLONOSCOPY      CORONARY ANGIOPLASTY      FOOT SURGERY Left     removed arthritis/fusion and applied hardware    FOOT SURGERY Left 4/3/2020    REMOVAL PROMINENT SCREW LEFT FOOT - ALLISON performed by Vincent Serna MD at [address] SURGERY Left 12/31/2020    LEFT INDEX FINGER DIGITAL MUCOUS CYST EXCISION WITH DISTAL INTERPHALANGEAL JOINT DEBRIDEMENT performed by Radha Jose MD at [facility]      discectomy    TOTAL KNEE ARTHROPLASTY Right 2/27/2019    RIGHT TOTAL KNEE REPLACEMENT performed by Verónica Bowling MD at [address] History     Socioeconomic History    Marital status:      Spouse name: Not on file    Number of children: Not on file    Years of education: Not on file    Highest education level: Not on file   Occupational History    Occupation: sales   Tobacco Use    Smoking status: Never    Smokeless tobacco: Never   Vaping Use    Vaping Use: Never used   Substance and Sexual Activity    Alcohol use: Yes     Comment: occasionally    Drug use: Never    Sexual activity: Not Currently   Other Topics Concern    Not on file   Social History Narrative    Not on file     Social Determinants of Health     Financial Resource Strain: Not on file   Food Insecurity: Not on file   Transportation Needs: Not on file   Physical Activity: Not on file   Stress: Not on file   Social Connections: Not on file   Intimate Partner Violence: Not on file   Housing Stability: Not on file       Family History   Problem Relation Age of Onset    Other Mother         COPD    No Known Problems Father        Current Outpatient Medications on File Prior to Visit   Medication Sig Dispense Refill    zolpidem (AMBIEN) 10 MG tablet Take by mouth nightly as needed for Sleep.       Cholecalciferol (VITAMIN D) 50 MCG (2000 UT) CAPS capsule Take by mouth daily       Multiple Vitamins-Minerals (THERAPEUTIC MULTIVITAMIN-MINERALS) tablet Take 1 tablet by mouth daily ATORVASTATIN CALCIUM PO Take 40 mg by mouth daily       aspirin EC 81 MG EC tablet Take 1 tablet by mouth 2 times daily Please avoid missing doses. Take for DVT blood clot prophylaxis (Patient taking differently: Take 81 mg by mouth daily ) 60 tablet 0    losartan-hydrochlorothiazide (HYZAAR) 100-12.5 MG per tablet Take 1 tablet by mouth daily      gabapentin (NEURONTIN) 300 MG capsule Take 300 mg by mouth as needed. metoprolol tartrate (LOPRESSOR) 50 MG tablet TAKE 1 TABLET TWICE A DAY      levothyroxine (SYNTHROID) 75 MCG tablet Take 112.5 mcg by mouth Daily        No current facility-administered medications on file prior to visit. Pertinent items are noted in HPI  Review of systems reviewed from Patient History Form and available in the patient's chart under the Media tab. No change noted. PHYSICAL EXAMINATION:  Ms. Oviedo is a very pleasant 79 y.o.  female who presents today in no acute distress, awake, alert, and oriented. She is well dressed, nourished and  groomed. Patient with normal affect. Height is  5' 7\" (1.702 m), weight is 200 lb (90.7 kg), Body mass index is 31.32 kg/m². Resting respiratory rate is 16. Examination of the gait, showed that the patient walks heel-toe with minimal limp. The incision healing well left foot. No signs of any erythema or drainage, no swelling. She has no pain with the active or passive range of motion of the left foot, good ROM. She has intact sensation distally, and she is neurovascularly intact. Examination of both ankles showing dorsiflexion to about 10 degrees bilaterally, which increased with knee flexion. She has intact sensation and good pedal pulses. She has good strength in all four planes, including eversion, and has tenderness on deep palpation over the right midfoot and osteophytes right foot compared to the other side. The ankles are stable to drawer test bilaterally, equally.         IMAGING:  Three views left foot taken 10/16/2020 in the office showed hardware removal, no fracture. The remaining hardware is intact with no signs of failure. Xray, 3 views of the right foot taken dated 4/27/2022 in the office was reviewed and showed midfoot arthritis affecting the right second and third TMT joints. CT scan left foot dated 3/31/2020 from [facility] was reviewed and showed a non union 2nd TMT arthrodesis. IMPRESSION:    1-Left prominent deep screw hardware removal and doing very well. 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 PLAN: For the left foot:  She can go back to normal activity with no restrictions. She will be seen PRN. I told the patient that it is not unusual to have some achy pain and swelling for up to a year after a fracture. For the right foot: I discussed the findings with the patient, reviewed the x-ray results and discussed treatment options. She was instructed to work on stretching exercises of the calf. She can take NSAIDs as needed. I discussed with her that she may benefit from a cortisone injection in the right foot second and third TMT joints under fluoroscopy and she agreed to proceed. She is aware that she may need surgical fusion in the future, but she would like to hold off at this point. We will plan on scheduling the cortisone injection next Wednesday at [address] Pee Santacruz MD

## 2022-08-31 ENCOUNTER — HOSPITAL ENCOUNTER (OUTPATIENT)
Dept: GENERAL RADIOLOGY | Age: 67
Discharge: HOME | End: 2022-08-31
Payer: MEDICARE

## 2022-08-31 DIAGNOSIS — M19.079 ARTHRITIS OF MIDFOOT: ICD-10-CM

## 2022-08-31 PROCEDURE — 77002 NEEDLE LOCALIZATION BY XRAY: CPT | Performed by: ORTHOPAEDIC SURGERY

## 2022-08-31 PROCEDURE — 20605 DRAIN/INJ JOINT/BURSA W/O US: CPT | Performed by: ORTHOPAEDIC SURGERY

## 2022-08-31 PROCEDURE — 77002 NEEDLE LOCALIZATION BY XRAY: CPT

## 2022-08-31 PROCEDURE — 6360000002 HC RX W HCPCS

## 2022-08-31 PROCEDURE — 2500000003 HC RX 250 WO HCPCS

## 2022-08-31 PROCEDURE — 20600 DRAIN/INJ JOINT/BURSA W/O US: CPT

## 2022-08-31 NOTE — PROGRESS NOTES
PROCEDURE:         8/31/2022   With the patient's permission, and under sterile condition, and using Fluoroscopy at the Hospital, the right foot was prepared and draped with alcohol and  The right 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 joints were injected with a mixture of 1 ml Kenalog 40mg and 2 ml of 1% lidocaine. The patient tolerated the procedure well. A band-aid was applied and the patient was advised to ice the ankle for 15-20 minutes to relieve any injection site related pain. she reported a good improvement immediatly after the injection. 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       Umang Farfan MD

## 2022-09-21 NOTE — PROGRESS NOTES
Lexiscan Stress EKG Report: Indication: Atypical angina    Baseline EKG: normal sinus rhythm, early repolarization. Stress EKG: No ST-T changes. Arrhythmias: None. Symptoms: None. Summary:  Unremarkable lexiscan stress EKG. See separate report for stress perfusion results.      Shannon Alcantara MD  Cardiologist  Cardiology, [address] [facility] This dictation was done with [employer] dictation and may contain mechanical errors related to translation. Blood pressure (!) 149/89, pulse 74, temperature 99.3 °F (37.4 °C), resp. rate 16, height 5' 7\" (1.702 m), weight 185 lb (83.9 kg), currently breastfeeding.       Kenalog:  NDC: [id number]  Lot Number: [id number]  Expiration Date: 5/19

## 2022-11-21 ENCOUNTER — OFFICE VISIT (OUTPATIENT)
Dept: ORTHOPEDIC SURGERY | Age: 67
End: 2022-11-21
Payer: MEDICARE

## 2022-11-21 VITALS — HEIGHT: 67 IN | WEIGHT: 219 LBS | BODY MASS INDEX: 34.37 KG/M2 | RESPIRATION RATE: 16 BRPM

## 2022-11-21 DIAGNOSIS — M25.511 RIGHT SHOULDER PAIN, UNSPECIFIED CHRONICITY: Primary | ICD-10-CM

## 2022-11-21 DIAGNOSIS — M67.911 TENDINOPATHY OF RIGHT ROTATOR CUFF: ICD-10-CM

## 2022-11-21 PROCEDURE — 20610 DRAIN/INJ JOINT/BURSA W/O US: CPT | Performed by: ORTHOPAEDIC SURGERY

## 2022-11-21 PROCEDURE — 3017F COLORECTAL CA SCREEN DOC REV: CPT | Performed by: ORTHOPAEDIC SURGERY

## 2022-11-21 PROCEDURE — G8484 FLU IMMUNIZE NO ADMIN: HCPCS | Performed by: ORTHOPAEDIC SURGERY

## 2022-11-21 PROCEDURE — 99213 OFFICE O/P EST LOW 20 MIN: CPT | Performed by: ORTHOPAEDIC SURGERY

## 2022-11-21 PROCEDURE — 1123F ACP DISCUSS/DSCN MKR DOCD: CPT | Performed by: ORTHOPAEDIC SURGERY

## 2022-11-21 PROCEDURE — G8417 CALC BMI ABV UP PARAM F/U: HCPCS | Performed by: ORTHOPAEDIC SURGERY

## 2022-11-21 PROCEDURE — 1036F TOBACCO NON-USER: CPT | Performed by: ORTHOPAEDIC SURGERY

## 2022-11-21 PROCEDURE — G8400 PT W/DXA NO RESULTS DOC: HCPCS | Performed by: ORTHOPAEDIC SURGERY

## 2022-11-21 PROCEDURE — 1090F PRES/ABSN URINE INCON ASSESS: CPT | Performed by: ORTHOPAEDIC SURGERY

## 2022-11-21 PROCEDURE — G8428 CUR MEDS NOT DOCUMENT: HCPCS | Performed by: ORTHOPAEDIC SURGERY

## 2022-11-21 RX ORDER — LIDOCAINE HYDROCHLORIDE 10 MG/ML
4 INJECTION, SOLUTION INFILTRATION; PERINEURAL ONCE
Status: COMPLETED | OUTPATIENT
Start: 2022-11-21 | End: 2022-11-21

## 2022-11-21 RX ORDER — BUPIVACAINE HYDROCHLORIDE 2.5 MG/ML
4 INJECTION, SOLUTION INFILTRATION; PERINEURAL ONCE
Status: COMPLETED | OUTPATIENT
Start: 2022-11-21 | End: 2022-11-21

## 2022-11-21 RX ORDER — TRIAMCINOLONE ACETONIDE 40 MG/ML
40 INJECTION, SUSPENSION INTRA-ARTICULAR; INTRAMUSCULAR ONCE
Status: COMPLETED | OUTPATIENT
Start: 2022-11-21 | End: 2022-11-21

## 2022-11-21 RX ADMIN — TRIAMCINOLONE ACETONIDE 40 MG: 40 INJECTION, SUSPENSION INTRA-ARTICULAR; INTRAMUSCULAR at 11:20

## 2022-11-21 RX ADMIN — BUPIVACAINE HYDROCHLORIDE 10 MG: 2.5 INJECTION, SOLUTION INFILTRATION; PERINEURAL at 11:19

## 2022-11-21 RX ADMIN — LIDOCAINE HYDROCHLORIDE 4 ML: 10 INJECTION, SOLUTION INFILTRATION; PERINEURAL at 11:20

## 2022-11-21 NOTE — PROGRESS NOTES
CHIEF COMPLAINT: Right shoulder pain    History:    Surinder Ang is a 79 y.o. left handed female self-referred for evaluation and treatment of Right shoulder pain. This is evaluated as a personal injury. The pain began 6-8 months ago. It worsened over the last 3-4 months. Pain is rated as a 9/10. There was not an injury. Pain is located laterally and anteriorly. Symptoms are aggravated with overhead activity, abduction, laying on her side, and reaching behind her back. The patient has not had PT. The patient has not had an injection. The patient has tried NSAIDs with relief. The patient has not tried ice. Patient's occupation is retired      Past Medical History:   Diagnosis Date    Arthritis     CAD (coronary artery disease)     not following with cardiology    Chronic insomnia     GERD (gastroesophageal reflux disease)     mild    History of blood transfusion     after childbirth    Hyperlipidemia     Hypertension     Hypothyroidism     PEDRITO (obstructive sleep apnea)     does not use cpap    Plantar fasciitis     Wears glasses        Past Surgical History:   Procedure Laterality Date     SECTION      times 2    COLONOSCOPY      CORONARY ANGIOPLASTY      FOOT SURGERY Left     removed arthritis/fusion and applied hardware    FOOT SURGERY Left 4/3/2020    REMOVAL PROMINENT SCREW LEFT FOOT - ALLISON performed by Mahsa Venegas MD at [address]    HAND SURGERY Left 2020    LEFT INDEX FINGER DIGITAL MUCOUS CYST EXCISION WITH DISTAL INTERPHALANGEAL JOINT DEBRIDEMENT performed by Kenney Fierro MD at [facility] 99      discectomy    TOTAL KNEE ARTHROPLASTY Right 2019    RIGHT TOTAL KNEE REPLACEMENT performed by Mikki Venegas MD at [facility] 91       Current Outpatient Medications on File Prior to Visit   Medication Sig Dispense Refill    zolpidem (AMBIEN) 10 MG tablet Take by mouth nightly as needed for Sleep.       Cholecalciferol (VITAMIN D) 50 MCG ( UT) CAPS capsule Take by mouth daily       Multiple Vitamins-Minerals (THERAPEUTIC MULTIVITAMIN-MINERALS) tablet Take 1 tablet by mouth daily      ATORVASTATIN CALCIUM PO Take 40 mg by mouth daily       losartan-hydrochlorothiazide (HYZAAR) 100-12.5 MG per tablet Take 1 tablet by mouth daily      metoprolol tartrate (LOPRESSOR) 50 MG tablet TAKE 1 TABLET TWICE A DAY      levothyroxine (SYNTHROID) 75 MCG tablet Take 112.5 mcg by mouth Daily       aspirin EC 81 MG EC tablet Take 1 tablet by mouth 2 times daily Please avoid missing doses. Take for DVT blood clot prophylaxis (Patient taking differently: Take 81 mg by mouth daily ) 60 tablet 0    gabapentin (NEURONTIN) 300 MG capsule Take 300 mg by mouth as needed. No current facility-administered medications on file prior to visit.        Allergies   Allergen Reactions    Lisinopril Other (See Comments)     Cough       Social History     Socioeconomic History    Marital status:      Spouse name: Not on file    Number of children: Not on file    Years of education: Not on file    Highest education level: Not on file   Occupational History    Occupation: sales   Tobacco Use    Smoking status: Never    Smokeless tobacco: Never   Vaping Use    Vaping Use: Never used   Substance and Sexual Activity    Alcohol use: Yes     Comment: occasionally    Drug use: Never    Sexual activity: Not Currently   Other Topics Concern    Not on file   Social History Narrative    Not on file     Social Determinants of Health     Financial Resource Strain: Not on file   Food Insecurity: Not on file   Transportation Needs: Not on file   Physical Activity: Not on file   Stress: Not on file   Social Connections: Not on file   Intimate Partner Violence: Not on file   Housing Stability: Not on file       Family History   Problem Relation Age of Onset    Other Mother         COPD    No Known Problems Father            Physical Examination:      Vital signs:  Resp 16   Ht 5' 7\" (1.702 m)   Wt 219 lb (99.3 kg)   BMI 34.30 kg/m²     General:   alert, appears stated age, cooperative, and no distress   Right Shoulder   Active ROM:   forward flexion 135, external rotation 50, internal rotation L4. Left shoulder: forward flexion 180, external rotation 80, internal rotation L4. Passive ROM:  forward flexion 180    Joint Tenderness:   biceps tendon, lateral acromial   Neer:   positive   Bailey:   positive   Strength:   5/5 Supraspinatus, External rotation    Bilateral shoulders   Drop-arm test:   negative   Belly-press test:   negative   Bear-hug test:   negative   Speed's test:   positive   Bicipital groove tenderness:  positive   Fonseca's test:   not tested   Cross-body adduction test:   positive posterior   AC joint tenderness:   positive     There are no skin lesions, cellulitis, or extreme edema in the upper extremities. Sensation is grossly intact to light touch bilaterally upper extremity. The patient has warm and well-perfused bilateral upper extremities with brisk capillary refill. Imaging   Right Shoulder X-Ray: 3 view x-rays of the shoulder including AP, scapular Y, and axillary obtained and reviewed  AC Joint: narrowing moderate  Glenohumeral joint: no abnormalities noted  Elevation humeral head: absent      Assessment:      Right shoulder rotator cuff tendinopathy  CAD  GERD  Hypertension  Sleep apnea      Plan:      Natural history and expected course discussed. Questions answered. Ice prn. Make sure ice does not directly contact skin to avoid risk of frostbite. Continue NSAIDs as needed. The patient was advised that NSAID-type medications have two very important potential side effects: gastrointestinal irritation including hemorrhage and renal injuries. He was asked to take the medication with food and to stop if he experiences any GI upset. I asked him to call for vomiting, abdominal pain or black/bloody stools.  The patient expresses understanding of these issues and questions were answered. PT referral.    The risks and benefits of an injection were discussed with the patient. The patient had full opportunity to ask questions and all were answered. The patient then provided verbal informed consent. The skin was then prepped with betadine solution and alcohol. Under aseptic conditions, the  right subacromial space was injected with 4cc of 1% xylocaine, 4cc of 0.25% marcaine, and 1cc of Kenalog (40mg/ml). There were no immediate complications following the injection. The patient was advised of the possibility of injection site reaction and instructed to apply ice to the area and take NSAIDs if able. Follow up in 2 months. Call IF NO improvement with PT after 4-6 weeks and I will order MRI prior to patient being seen back in office. Rachell Newberry. Marlena Bland MD  Orthopaedic Surgery and Sports Medicine     Disclaimer: This note was generated with use of a verbal recognition program and an attempt was made to check for errors. It is possible that there are still dictated errors within this office note. If so, please bring any significant errors to my attention for an addendum. All efforts were made to ensure that this office note is accurate.

## 2022-12-07 ENCOUNTER — TELEPHONE (OUTPATIENT)
Dept: ORTHOPEDIC SURGERY | Age: 67
End: 2022-12-07

## 2022-12-07 DIAGNOSIS — M25.511 RIGHT SHOULDER PAIN, UNSPECIFIED CHRONICITY: Primary | ICD-10-CM

## 2022-12-07 NOTE — TELEPHONE ENCOUNTER
S/W JENISE  She states her grandson had heart surgery and she recently had covid so she has not scheduled PT at this time for that reason. She denies worsening pain, stating the 11.21.2022 injection helped for ~1 hour then symptoms/pain returned to previous levels. Lavell Mckeon denies new LIUDMILA but notes shooting, sharp pain after picking up her grandson. Ecchymosis along biceps muscle belly over the last few days, no n/t in hand/fingers. Discussed proceeding with MRI, but she declines at this time after discussion and will proceed with PT and follow up as previously scheduled on 1/23/2023 at 9:45am at [facility]. 12/13/2022 appointment cancelled at her request.   She will call the office if she changes her mind and wishes to proceed with the MRI prior to her next follow up. Patient voiced understanding of the conversation and will contact the office with further questions or concerns.

## 2022-12-07 NOTE — TELEPHONE ENCOUNTER
General Question     Subject: PATIENT IS STATING THE PAIN R SHOULDER HAS GOTTEN WORSE AND THERE IS BRUISING FROM THE SHOULDER DOWN TO THE ELBOW.  PLEASE ADVISE   Patient: Staples Aida  Contact Number: [phone]

## 2022-12-09 NOTE — TELEPHONE ENCOUNTER
S/W JENISE  Reports increased ecchymosis, swelling, itching, redness. Denies shininess in skin. Reports continued negative covid test. Reports cough that started recently. Reports adequate cap refill of 2-3 seconds with increased pulse, no rate given. Discussed sx with MARGARITA who recommended ED visit. Patient advised to go to ED for sx. MRI order placed in chart so that she may schedule if everything is r/o in terms of being orthopedic related in nature. Patient voiced understanding of the conversation and will contact the office with further questions or concerns.

## 2023-03-03 ENCOUNTER — TELEPHONE (OUTPATIENT)
Dept: ORTHOPEDIC SURGERY | Age: 68
End: 2023-03-03

## 2023-03-03 DIAGNOSIS — M19.079 ARTHRITIS OF MIDFOOT: Primary | ICD-10-CM

## 2023-03-03 NOTE — TELEPHONE ENCOUNTER
OTHER    Subject: RT FOOT INJ DONE AT [facility] [address]  Patient Request: Janice Cowden  Contact Number: [phone]    PATIENT IS REQ A RETURN CALL TO SCHEDULE HER RT FOOT INJ SHE GETS EVERY 6 MONTHS    PLEASE RETURN CALL TO THE ABOVE NUMBER.

## 2023-03-03 NOTE — LETTER
[address] [facility]  Surgery Scheduling Form:      DEMOGRAPHICS:                                                                                                                  Patient Name:  Odell Whitley  Patient :  1955   Patient SS#:      Patient Phone:  [phone] (home)  Alt. Patient Phone:    Patient Address:  [address] [address]    PCP:  Concepcion ID Number:  Payer/Plan Subscr  Sex Relation Sub. Ins. ID Effective Group Num   1. MEDICARE - [employer]ELov[employer] Radar 1955 Female Self [id number]                                    [address] [employer]  1955 Female Self [id number]                                    [address]        DIAGNOSIS & PROCEDURE:                                                                                                Diagnosis:  right Midfoot arthritis  Operation:   right second and third tarsometatarsal joints fluoro guided injections   Location: [place]  Provider:  Valentine Vega. Melecio Apple MD      SCHEDULING INFORMATION:                                                                                         .    Requested Date:      OR Time:                        Patient Arrival Time:    OR Time Required:  15  Minutes  Anesthesia:    Equipment:    Mini C-Arm:  No   Standard C-Arm:  No  Status:  Outpatient  PAT Required:  No    Comments:        Ji Apple MD     23

## 2023-03-08 ENCOUNTER — HOSPITAL ENCOUNTER (OUTPATIENT)
Dept: GENERAL RADIOLOGY | Age: 68
Discharge: HOME | End: 2023-03-08
Payer: MEDICARE

## 2023-03-08 DIAGNOSIS — M19.079 ARTHRITIS OF MIDFOOT: ICD-10-CM

## 2023-03-08 DIAGNOSIS — M79.671 RIGHT FOOT PAIN: ICD-10-CM

## 2023-03-08 PROCEDURE — 2500000003 HC RX 250 WO HCPCS

## 2023-03-08 PROCEDURE — 6360000002 HC RX W HCPCS

## 2023-03-08 PROCEDURE — 77002 NEEDLE LOCALIZATION BY XRAY: CPT

## 2023-03-08 PROCEDURE — 20600 DRAIN/INJ JOINT/BURSA W/O US: CPT

## 2023-03-08 NOTE — PROGRESS NOTES
PROCEDURE:         3/8/2023   With the patient's permission, and under sterile condition, and using Fluoroscopy at the Hospital, the right foot was prepared and draped with alcohol and  The right 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 joints were injected with a mixture of 1 ml Kenalog 40mg and 2 ml of 1% lidocaine. The patient tolerated the procedure well. A band-aid was applied and the patient was advised to ice the ankle for 15-20 minutes to relieve any injection site related pain. she reported a good improvement immediatly after the injection. 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       Mark Patiño MD

## 2023-07-31 ENCOUNTER — OFFICE VISIT (OUTPATIENT)
Dept: ORTHOPEDIC SURGERY | Age: 68
End: 2023-07-31
Payer: MEDICARE

## 2023-07-31 VITALS — RESPIRATION RATE: 16 BRPM | WEIGHT: 219 LBS | HEIGHT: 67 IN | BODY MASS INDEX: 34.37 KG/M2

## 2023-07-31 DIAGNOSIS — M67.40 MUCOID CYST OF JOINT: ICD-10-CM

## 2023-07-31 DIAGNOSIS — M79.644 PAIN OF FINGER OF RIGHT HAND: Primary | ICD-10-CM

## 2023-07-31 PROCEDURE — 1090F PRES/ABSN URINE INCON ASSESS: CPT | Performed by: PHYSICIAN ASSISTANT

## 2023-07-31 PROCEDURE — 1123F ACP DISCUSS/DSCN MKR DOCD: CPT | Performed by: PHYSICIAN ASSISTANT

## 2023-07-31 PROCEDURE — 3017F COLORECTAL CA SCREEN DOC REV: CPT | Performed by: PHYSICIAN ASSISTANT

## 2023-07-31 PROCEDURE — G8400 PT W/DXA NO RESULTS DOC: HCPCS | Performed by: PHYSICIAN ASSISTANT

## 2023-07-31 PROCEDURE — 99213 OFFICE O/P EST LOW 20 MIN: CPT | Performed by: PHYSICIAN ASSISTANT

## 2023-07-31 PROCEDURE — G8417 CALC BMI ABV UP PARAM F/U: HCPCS | Performed by: PHYSICIAN ASSISTANT

## 2023-07-31 PROCEDURE — 1036F TOBACCO NON-USER: CPT | Performed by: PHYSICIAN ASSISTANT

## 2023-07-31 PROCEDURE — G8427 DOCREV CUR MEDS BY ELIG CLIN: HCPCS | Performed by: PHYSICIAN ASSISTANT

## 2023-07-31 RX ORDER — METHYLPREDNISOLONE 4 MG/1
TABLET ORAL
Qty: 1 KIT | Refills: 0 | Status: SHIPPED | OUTPATIENT
Start: 2023-07-31

## 2023-07-31 SDOH — HEALTH STABILITY: PHYSICAL HEALTH: ON AVERAGE, HOW MANY MINUTES DO YOU ENGAGE IN EXERCISE AT THIS LEVEL?: 20 MIN

## 2023-07-31 SDOH — HEALTH STABILITY: PHYSICAL HEALTH: ON AVERAGE, HOW MANY DAYS PER WEEK DO YOU ENGAGE IN MODERATE TO STRENUOUS EXERCISE (LIKE A BRISK WALK)?: 3 DAYS

## 2023-07-31 NOTE — PROGRESS NOTES
finger of right hand  M79.644 XR FINGER RIGHT (MIN 2 VIEWS)      2. Mucoid cyst of joint  M67.40     DIP Right long finger           Assessment/ Plan:     Assessment:  Right long finger DIP joint arthritis with probable digital mucous cyst.  There does not appear to be infectious process at this time. She is covered with antibiotics- Cefalexin which was prescribed in the [facility] 2 days ago. No tenosynovitis. I had an extensive discussion with Ms. Tavia Godoy and/or family regarding the natural history, etiology, and long term consequences of her condition. I have presented reasonable alternatives to the patient's proposed care, treatment, and services. Risks and benefits of the treatment options also reviewed in detail. I have outlined a treatment plan with them. She has had full opportunity to ask her questions. I have answered them all to her satisfaction. I feel that Ms. Tavia Godoy understands our discussion today. I discussed clinical findings with Toni Allen MD.  He suggested oral steroids in addition to current treatment. She does have a follow-up appointment with him in 3 days. Plan:  Medications- medrol dose pack, 4mg tabs. May clean the finger several times daily with rubbing alcohol. Follow up- with Dr Elvis Michael as schedule if symptoms fail to resolve. She was specifically instructed to call or return to clinic if these symptoms worsen or fail to improve as anticipated. Nolvia Gudino PA-C   Senior Physician Assistant   [facility]                                         Disclaimer: This note was generated with use of a verbal recognition program (DRAGON) and an attempt was made to check for errors. It is possible that there are still dictated errors within this office note. If so, please bring any significant errors to my attention for an addendum.   All efforts were made to ensure

## 2023-10-18 ENCOUNTER — TELEPHONE (OUTPATIENT)
Dept: ORTHOPEDIC SURGERY | Age: 68
End: 2023-10-18

## 2023-10-18 DIAGNOSIS — M19.079 ARTHRITIS OF MIDFOOT: Primary | ICD-10-CM

## 2023-10-25 ENCOUNTER — HOSPITAL ENCOUNTER (OUTPATIENT)
Dept: GENERAL RADIOLOGY | Age: 68
Discharge: HOME | End: 2023-10-25
Attending: ORTHOPAEDIC SURGERY
Payer: MEDICARE

## 2023-10-25 ENCOUNTER — TELEPHONE (OUTPATIENT)
Dept: ORTHOPEDIC SURGERY | Age: 68
End: 2023-10-25

## 2023-10-25 DIAGNOSIS — M19.079 ARTHRITIS OF MIDFOOT: ICD-10-CM

## 2023-10-25 PROCEDURE — 20610 DRAIN/INJ JOINT/BURSA W/O US: CPT

## 2023-10-25 PROCEDURE — 77002 NEEDLE LOCALIZATION BY XRAY: CPT

## 2023-10-25 PROCEDURE — 6360000002 HC RX W HCPCS

## 2023-10-25 PROCEDURE — 2500000003 HC RX 250 WO HCPCS

## 2023-10-25 NOTE — TELEPHONE ENCOUNTER
Spoke with Kenneth Jensen to explain that Dr Johnnie Torres has to start a surgery at 6 AM. Dr Johnnie Torres can still do her fluoro injection if patient is OK with waiting 15-30 minutes for him to finish the procedure. Patient is OK with this. I suggested she report at 11:45 today.

## 2024-02-12 ENCOUNTER — TELEPHONE (OUTPATIENT)
Dept: ORTHOPEDIC SURGERY | Age: 69
End: 2024-02-12

## 2024-02-12 NOTE — TELEPHONE ENCOUNTER
Patient last seen 8/25/2022 and last XR on 1/5/2022. Patient was informed she requires an office visit for fluoro injections.     Patient was scheduled for 2/21/24 at the [facility], [address] [place] [address]., [address], [place] [address].

## 2024-02-12 NOTE — TELEPHONE ENCOUNTER
Surgery and/or Procedure Scheduling     Contact Name: JENISE OCAMPO  Surgical/Procedure Request: FLUORO GUIDED INJECTIONS / RIGHT FOOT  Patient Contact Number: [phone]

## 2024-02-21 ENCOUNTER — OFFICE VISIT (OUTPATIENT)
Dept: ORTHOPEDIC SURGERY | Age: 69
End: 2024-02-21
Payer: MEDICARE

## 2024-02-21 VITALS — WEIGHT: 219 LBS | HEIGHT: 67 IN | BODY MASS INDEX: 34.37 KG/M2

## 2024-02-21 DIAGNOSIS — M19.079 ARTHRITIS OF MIDFOOT: Primary | ICD-10-CM

## 2024-02-21 PROCEDURE — 1123F ACP DISCUSS/DSCN MKR DOCD: CPT | Performed by: ORTHOPAEDIC SURGERY

## 2024-02-21 PROCEDURE — 1090F PRES/ABSN URINE INCON ASSESS: CPT | Performed by: ORTHOPAEDIC SURGERY

## 2024-02-21 PROCEDURE — 3017F COLORECTAL CA SCREEN DOC REV: CPT | Performed by: ORTHOPAEDIC SURGERY

## 2024-02-21 PROCEDURE — G8417 CALC BMI ABV UP PARAM F/U: HCPCS | Performed by: ORTHOPAEDIC SURGERY

## 2024-02-21 PROCEDURE — G8400 PT W/DXA NO RESULTS DOC: HCPCS | Performed by: ORTHOPAEDIC SURGERY

## 2024-02-21 PROCEDURE — 1036F TOBACCO NON-USER: CPT | Performed by: ORTHOPAEDIC SURGERY

## 2024-02-21 PROCEDURE — G8484 FLU IMMUNIZE NO ADMIN: HCPCS | Performed by: ORTHOPAEDIC SURGERY

## 2024-02-21 PROCEDURE — 99213 OFFICE O/P EST LOW 20 MIN: CPT | Performed by: ORTHOPAEDIC SURGERY

## 2024-02-21 PROCEDURE — G8427 DOCREV CUR MEDS BY ELIG CLIN: HCPCS | Performed by: ORTHOPAEDIC SURGERY

## 2024-02-21 NOTE — PROGRESS NOTES
DIAGNOSIS:    1-Left foot prominent deep screw hardware removal.  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    DATE OF SURGERY: 4/3/2020.    HISTORY OF PRESENT ILLNESS:  Ms. Matos 68 y.o.  female who came in today for follow up visit.  The patient denies any pain in the left foot and rates 0/10 VAS. She has been WBAT and doing much better.  No numbness or tingling sensation. No fever or Chills.     She is also here for worsening pain in her right midfoot over the past couple months.  She had a cortisone injection in her right midfoot second and third TMT joint on 10/25/2023 under fluoroscopy and had good relief.  She was initially seen as a 2nd opinion for evaluation of bilateral midfoot pain which started over 10 years ago.  She is complaining of sharp right midfoot pain.  She rates her pain a 7/10 VAS.  Pain is increase with standing and walking and shoe wear. Pain is sharp with first few steps, dull achy pain by the end of the day. Alleviating factors: elevation and rest.  She is wanting to repeat a cortisone injection in her right foot.  No radiation and no numbness and tingling sensation. No other complaint.   She had left foot 2nd TMT fusion in 2016 and then revision fusion with calcaneus bone graft in 2017 by Dr Fitch. She saw him in March and told her about the prominent screw and that she might need removal, and also right midfoot fusion. She then saw Dr Mauro at [place] and had CT scan left foot 3/31/2020.     Past Medical History:   Diagnosis Date    Arthritis     CAD (coronary artery disease)     not following with cardiology    Chronic insomnia     GERD (gastroesophageal reflux disease)     mild    History of blood transfusion     after childbirth    Hyperlipidemia     Hypertension     Hypothyroidism     PEDRITO (obstructive sleep apnea)     does not use cpap    Plantar fasciitis     Wears glasses        Past Surgical History:   Procedure Laterality Date     SECTION      times

## 2024-02-28 ENCOUNTER — HOSPITAL ENCOUNTER (OUTPATIENT)
Dept: GENERAL RADIOLOGY | Age: 69
Discharge: HOME | End: 2024-02-28
Attending: ORTHOPAEDIC SURGERY
Payer: MEDICARE

## 2024-02-28 DIAGNOSIS — M19.079 ARTHRITIS OF MIDFOOT: ICD-10-CM

## 2024-02-28 PROCEDURE — 20610 DRAIN/INJ JOINT/BURSA W/O US: CPT

## 2024-02-28 NOTE — PROGRESS NOTES
PROCEDURE:         2/28/2024    With the patient's permission, and under sterile condition, and using Fluoroscopy at the Hospital, the right foot was prepared and draped with alcohol and  The right 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 joints were injected with a mixture of 1 ml Kenalog 40mg and 2 ml of 1% lidocaine. The patient tolerated the procedure well.   A band-aid was applied and the patient was advised to ice the ankle for 15-20 minutes to relieve any injection site related pain.     she reported a good improvement immediatly after the injection.      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      Ji Bañuelos MD

## 2024-10-08 DIAGNOSIS — M19.071 ARTHRITIS OF RIGHT MIDFOOT: Primary | ICD-10-CM

## 2024-10-09 ENCOUNTER — HOSPITAL ENCOUNTER (OUTPATIENT)
Dept: GENERAL RADIOLOGY | Age: 69
Discharge: HOME | End: 2024-10-09
Attending: ORTHOPAEDIC SURGERY
Payer: MEDICARE

## 2024-10-09 DIAGNOSIS — M19.071 ARTHRITIS OF RIGHT MIDFOOT: ICD-10-CM

## 2024-10-09 PROCEDURE — 77002 NEEDLE LOCALIZATION BY XRAY: CPT

## 2024-10-09 PROCEDURE — 2500000003 HC RX 250 WO HCPCS

## 2024-10-09 PROCEDURE — 6360000002 HC RX W HCPCS

## 2024-10-09 PROCEDURE — 20610 DRAIN/INJ JOINT/BURSA W/O US: CPT

## 2025-07-01 ENCOUNTER — OFFICE VISIT (OUTPATIENT)
Dept: ORTHOPEDIC SURGERY | Age: 70
End: 2025-07-01
Payer: MEDICARE

## 2025-07-01 VITALS — WEIGHT: 219 LBS | HEIGHT: 67 IN | BODY MASS INDEX: 34.37 KG/M2

## 2025-07-01 DIAGNOSIS — T85.848D PAIN FROM IMPLANTED HARDWARE, SUBSEQUENT ENCOUNTER: ICD-10-CM

## 2025-07-01 DIAGNOSIS — M19.071 ARTHRITIS OF RIGHT MIDFOOT: Primary | ICD-10-CM

## 2025-07-01 DIAGNOSIS — M79.644 PAIN OF FINGER OF RIGHT HAND: ICD-10-CM

## 2025-07-01 PROCEDURE — 1125F AMNT PAIN NOTED PAIN PRSNT: CPT | Performed by: ORTHOPAEDIC SURGERY

## 2025-07-01 PROCEDURE — 1159F MED LIST DOCD IN RCRD: CPT | Performed by: ORTHOPAEDIC SURGERY

## 2025-07-01 PROCEDURE — 1036F TOBACCO NON-USER: CPT | Performed by: ORTHOPAEDIC SURGERY

## 2025-07-01 PROCEDURE — G8427 DOCREV CUR MEDS BY ELIG CLIN: HCPCS | Performed by: ORTHOPAEDIC SURGERY

## 2025-07-01 PROCEDURE — 1123F ACP DISCUSS/DSCN MKR DOCD: CPT | Performed by: ORTHOPAEDIC SURGERY

## 2025-07-01 PROCEDURE — 1090F PRES/ABSN URINE INCON ASSESS: CPT | Performed by: ORTHOPAEDIC SURGERY

## 2025-07-01 PROCEDURE — G8400 PT W/DXA NO RESULTS DOC: HCPCS | Performed by: ORTHOPAEDIC SURGERY

## 2025-07-01 PROCEDURE — 99214 OFFICE O/P EST MOD 30 MIN: CPT | Performed by: ORTHOPAEDIC SURGERY

## 2025-07-01 PROCEDURE — G8417 CALC BMI ABV UP PARAM F/U: HCPCS | Performed by: ORTHOPAEDIC SURGERY

## 2025-07-01 PROCEDURE — 3017F COLORECTAL CA SCREEN DOC REV: CPT | Performed by: ORTHOPAEDIC SURGERY

## 2025-07-01 NOTE — PROGRESS NOTES
[facility], [facility]    Interpersonal Safety     Feel physically or emotionally unsafe where currently live: Not on file     Harm by anyone: Not on file     Emotionally Harmed: Not on file   Housing Stability: Unknown (1/20/2024)    Received from [facility], [facility]    Housing/Utilities     Worried about losing home: Not on file     Stayed outside house: Not on file     Unable to get utilities: Not on file       Family History   Problem Relation Age of Onset    Other Mother         COPD    No Known Problems Father        Current Outpatient Medications on File Prior to Visit   Medication Sig Dispense Refill    zolpidem (AMBIEN) 10 MG tablet Take by mouth nightly as needed for Sleep.      Cholecalciferol (VITAMIN D) 50 MCG (2000 UT) CAPS capsule Take by mouth daily       Multiple Vitamins-Minerals (THERAPEUTIC MULTIVITAMIN-MINERALS) tablet Take 1 tablet by mouth daily      ATORVASTATIN CALCIUM PO Take 40 mg by mouth daily       losartan-hydrochlorothiazide (HYZAAR) 100-12.5 MG per tablet Take 1 tablet by mouth daily      metoprolol tartrate (LOPRESSOR) 50 MG tablet TAKE 1 TABLET TWICE A DAY      levothyroxine (SYNTHROID) 75 MCG tablet Take 112.5 mcg by mouth Daily        No current facility-administered medications on file prior to visit.       Pertinent items are noted in HPI  Review of systems reviewed from Patient History Form and available in the patient's chart under the Media tab. No change noted.        PHYSICAL EXAMINATION:  Ms. Matos is a very pleasant 69 y.o.  female who presents today in no acute distress, awake, alert, and oriented.  She is well dressed, nourished and  groomed.  Patient with normal affect.  Height is  1.702 m (5' 7\"), weight is 99.3 kg (219 lb), Body mass index is 34.3 kg/m².  Resting respiratory rate is 16.     Examination of the gait, showed that the

## 2025-07-09 ENCOUNTER — HOSPITAL ENCOUNTER (OUTPATIENT)
Dept: GENERAL RADIOLOGY | Age: 70
Discharge: HOME | End: 2025-07-09
Attending: ORTHOPAEDIC SURGERY
Payer: MEDICARE

## 2025-07-09 DIAGNOSIS — M19.071 ARTHRITIS OF RIGHT MIDFOOT: ICD-10-CM

## 2025-07-09 PROCEDURE — 20610 DRAIN/INJ JOINT/BURSA W/O US: CPT

## 2025-07-09 PROCEDURE — 77002 NEEDLE LOCALIZATION BY XRAY: CPT

## 2025-07-09 NOTE — PROGRESS NOTES
PROCEDURE:         7/9/2025    With the patient's permission, and under sterile condition, and using Fluoroscopy at the Hospital, the right foot was prepared and draped with alcohol and  The right 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 joints were injected with a mixture of 1 ml Kenalog 40mg and 2 ml of 1% lidocaine. The patient tolerated the procedure well.   A band-aid was applied and the patient was advised to ice the ankle for 15-20 minutes to relieve any injection site related pain.     she reported a good improvement immediatly after the injection.      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      Ji Bañuelos MD

## (undated) DEVICE — COVER,MAYO STAND,STERILE: Brand: MEDLINE

## (undated) DEVICE — INTENDED FOR TISSUE SEPARATION, AND OTHER PROCEDURES THAT REQUIRE A SHARP SURGICAL BLADE TO PUNCTURE OR CUT.: Brand: BARD-PARKER ® STAINLESS STEEL BLADES

## (undated) DEVICE — SUTURE VCRL SZ 2-0 L27IN ABSRB UD L26MM SH 1/2 CIR J417H

## (undated) DEVICE — SUTURE ABSORBABLE MONOFILAMENT 1-0 OS8 14 IN STRATAFIX SPRL SXPD2B202

## (undated) DEVICE — MERCY FAIRFIELD TURNOVER KIT: Brand: MEDLINE INDUSTRIES, INC.

## (undated) DEVICE — CHLORAPREP 26ML ORANGE

## (undated) DEVICE — Device: Brand: POWER-FLO®

## (undated) DEVICE — HANDPIECE SET WITH HIGH FLOW TIP AND SUCTION TUBE: Brand: INTERPULSE

## (undated) DEVICE — MINOR SET UP PK

## (undated) DEVICE — SPONGE LAP W18XL18IN WHT COT 4 PLY FLD STRUNG RADPQ DISP ST

## (undated) DEVICE — GLOVE SURG SZ 85 L12IN FNGR THK13MIL WHT ISOLEX POLYISOPRENE

## (undated) DEVICE — COVER LT HNDL BLU PLAS

## (undated) DEVICE — SPECIMEN COLLECTION 4-PORT MANIFOLD: Brand: NEPTUNE 2

## (undated) DEVICE — BLADE SAW W12.5XL70MM THK1MM RECIP DBL SIDE OFFSET

## (undated) DEVICE — GLOVE 8 LTX ST TRIFLEX POWDER LK

## (undated) DEVICE — SHEET,DRAPE,53X77,STERILE: Brand: MEDLINE

## (undated) DEVICE — SOLUTION IV IRRIG POUR BRL 0.9% SODIUM CHL 2F7124

## (undated) DEVICE — SYRINGE MED 30ML STD CLR PLAS LUERLOCK TIP N CTRL DISP

## (undated) DEVICE — ELECTRODE PT RET AD L9FT HI MOIST COND ADH HYDRGEL CORDED

## (undated) DEVICE — GLOVE SURG SZ 75 CRM LTX FREE POLYISOPRENE POLYMER BEAD ANTI

## (undated) DEVICE — GLOVE,SURG,SENSICARE SLT,LF,PF,8.5: Brand: MEDLINE

## (undated) DEVICE — SOLUTION IV IRRIG 500ML 0.9% SODIUM CHL 2F7123

## (undated) DEVICE — GOWN,SIRUS,POLYRNF,BRTHSLV,XL,30/CS: Brand: MEDLINE

## (undated) DEVICE — HYPODERMIC SAFETY NEEDLE: Brand: MAGELLAN

## (undated) DEVICE — 3M™ STERI-DRAPE™ U-DRAPE 1015: Brand: STERI-DRAPE™

## (undated) DEVICE — TOTAL BASIC PK

## (undated) DEVICE — SOLUTION PREP POVIDONE IOD FOR SKIN MUCOUS MEM PRIOR TO

## (undated) DEVICE — DUAL CUT SAGITTAL BLADE

## (undated) DEVICE — GOWN SIRUS NONREIN XL W/TWL: Brand: MEDLINE INDUSTRIES, INC.

## (undated) DEVICE — MATTRESS MAXI AIR TRNSF SGL PT USE DCS 37 45 48 52 75

## (undated) DEVICE — PADDING UNDERCAST W4INXL4YD 100% COT CRIMPED FINISH WBRL II

## (undated) DEVICE — BANDAGE COMPR W6INXL10YD ST M E WHITE/BEIGE

## (undated) DEVICE — COVER,TABLE,77X90,STERILE: Brand: MEDLINE

## (undated) DEVICE — 3M™ IOBAN™ 2 ANTIMICROBIAL INCISE DRAPE 6650EZ: Brand: IOBAN™ 2

## (undated) DEVICE — BANDAGE COMPR W4INXL15FT BGE E SGL LAYERED CLP CLSR

## (undated) DEVICE — BANDAGE COMPR W4INXL12FT E DISP ESMARCH EVEN

## (undated) DEVICE — SOLUTION IV IRRIG 250ML ST LF 0.9% SODIUM 2F7122

## (undated) DEVICE — GLOVE,SURG,SENSICARE SLT,LF,PF,8: Brand: MEDLINE

## (undated) DEVICE — KIT STEREOTAXIC POD DISPOSABLE IASSIST

## (undated) DEVICE — 3M™ COBAN™ NL STERILE NON-LATEX SELF-ADHERENT WRAP, 2083S, 3 IN X 5 YD (7,5 CM X 4,5 M), 24 ROLLS/CASE: Brand: 3M™ COBAN™

## (undated) DEVICE — DRAPE HND W114XL142IN BLU POLYPR W O PCH FEN CRD AND TB HLDR

## (undated) DEVICE — DRESSING PETRO W3XL3IN OIL EMUL N ADH GZ KNIT IMPREG CELOS

## (undated) DEVICE — BASIC SINGLE BASIN 1-LF: Brand: MEDLINE INDUSTRIES, INC.

## (undated) DEVICE — Z DISCONTINUED USE 2716304 SUTURE STRATAFIX SPRL SZ 3-0 L12IN ABSRB UD FS-1 L24MM 3/8

## (undated) DEVICE — GAUZE,SPONGE,4"X4",8PLY,STRL,LF,10/TRAY: Brand: MEDLINE

## (undated) DEVICE — ZIMMER® STERILE DISPOSABLE TOURNIQUET CUFF WITH PLC, DUAL PORT, SINGLE BLADDER, 18 IN. (46 CM)

## (undated) DEVICE — COTTON UNDERCAST PADDING,CRIMPED FINISH: Brand: WEBRIL

## (undated) DEVICE — SPONGE GZ W4XL4IN COT 12 PLY TYP VII WVN C FLD DSGN

## (undated) DEVICE — SYRINGE IRRIG 60ML SFT PLIABLE BLB EZ TO GRP 1 HND USE W/

## (undated) DEVICE — APPLICATOR MEDICATED 26 CC SOLUTION HI LT ORNG CHLORAPREP

## (undated) DEVICE — PADDING CAST W4INXL4YD ST COT RAYON MICROPLEATED HIGHLY

## (undated) DEVICE — SUTURE MCRYL SZ 4-0 L27IN ABSRB UD L19MM PS-2 1/2 CIR PRIM Y426H

## (undated) DEVICE — GOWN,REINF,POLY,AURORA,XLNG/XXL,STRL: Brand: MEDLINE

## (undated) DEVICE — MASTISOL ADHESIVE LIQ 2/3ML

## (undated) DEVICE — STERILE PATIENT PROTECTIVE PAD FOR IMP® KNEE POSITIONERS & COHESIVE WRAP (10 / CASE): Brand: DE MAYO KNEE POSITIONER®

## (undated) DEVICE — SOLUTION IV 1000ML 0.9% SOD CHL FOR IRRIG PLAS CONT

## (undated) DEVICE — STERILE TOTAL KNEE DRAPE PACK: Brand: CARDINAL HEALTH

## (undated) DEVICE — BANDAGE COMPR W2INXL5YD TAN BRTH SELF ADH WRP W/ HND TEAR

## (undated) DEVICE — SUTURE STRATAFIX SPRL SZ 2 0 L14IN ABSRB UD MH L36MM 1 2 CIR SXMD2B401

## (undated) DEVICE — SYRINGE MED 10ML LUERLOCK TIP W/O SFTY DISP

## (undated) DEVICE — GLOVE SURG SZ 65 CRM LTX FREE POLYISOPRENE POLYMER BEAD ANTI

## (undated) DEVICE — KIT OR ROOM TURNOVER W/STRAP

## (undated) DEVICE — UNDERGLOVE SURG SZ 8 FNGR THK0.21MIL GRN LTX BEAD CUF

## (undated) DEVICE — DECANTER BAG 9": Brand: MEDLINE INDUSTRIES, INC.

## (undated) DEVICE — PAD,NON-ADHERENT,3X8,STERILE,LF,1/PK: Brand: MEDLINE

## (undated) DEVICE — SUTURE VCRL SZ 1 L18IN ABSRB UD L36MM CT-1 1/2 CIR J841D

## (undated) DEVICE — SYSTEM SKIN CLSR 22CM DERMBND PRINEO

## (undated) DEVICE — STERILE LATEX POWDER-FREE SURGICAL GLOVESWITH NITRILE COATING: Brand: PROTEXIS